# Patient Record
Sex: MALE | Race: WHITE
[De-identification: names, ages, dates, MRNs, and addresses within clinical notes are randomized per-mention and may not be internally consistent; named-entity substitution may affect disease eponyms.]

---

## 2018-05-23 ENCOUNTER — HOSPITAL ENCOUNTER (OUTPATIENT)
Age: 68
End: 2018-05-23
Payer: COMMERCIAL

## 2018-05-23 DIAGNOSIS — M51.26: Primary | ICD-10-CM

## 2018-05-23 LAB
ADD MANUAL DIFF / SLIDE REVIEW: NO
BUN SERPL-MCNC: 16 MG/DL (ref 9–20)
CALCIUM SERPL-MCNC: 9.2 MG/DL (ref 8.4–10.2)
CHLORIDE SERPL-SCNC: 102 MMOL/L (ref 98–107)
CO2 SERPL-SCNC: 28 MMOL/L (ref 22–32)
ESTIMATED GLOMERULAR FILT RATE: > 60 ML/MIN (ref 60–?)
GLUCOSE SERPL-MCNC: 79 MG/DL (ref 80–110)
HEMATOCRIT: 44 % (ref 41–53)
HEMOGLOBIN: 15.4 G/DL (ref 13.5–17.5)
HEMOLYSIS: < 15 (ref 0–50)
MCV RBC: 87.9 FL (ref 80–100)
MEAN CORPUSCULAR HEMOGLOBIN: 30.8 PG (ref 26–34)
MEAN CORPUSCULAR HGB CONC: 35 % (ref 30–36)
PLATELET COUNT: 212 X10^3/UL (ref 150–400)
POTASSIUM SERPL-SCNC: 4.7 MMOL/L (ref 3.4–5.1)
SODIUM SERPL-SCNC: 141 MMOL/L (ref 137–145)

## 2018-05-23 PROCEDURE — 80048 BASIC METABOLIC PNL TOTAL CA: CPT

## 2018-05-23 PROCEDURE — 36415 COLL VENOUS BLD VENIPUNCTURE: CPT

## 2018-05-23 PROCEDURE — 85025 COMPLETE CBC W/AUTO DIFF WBC: CPT

## 2018-05-23 PROCEDURE — 93005 ELECTROCARDIOGRAM TRACING: CPT

## 2018-06-05 ENCOUNTER — HOSPITAL ENCOUNTER (OUTPATIENT)
Age: 68
Discharge: HOME | End: 2018-06-05
Payer: COMMERCIAL

## 2018-06-05 VITALS — SYSTOLIC BLOOD PRESSURE: 100 MMHG | DIASTOLIC BLOOD PRESSURE: 61 MMHG | RESPIRATION RATE: 10 BRPM | HEART RATE: 86 BPM

## 2018-06-05 VITALS
RESPIRATION RATE: 10 BRPM | DIASTOLIC BLOOD PRESSURE: 58 MMHG | OXYGEN SATURATION: 98 % | SYSTOLIC BLOOD PRESSURE: 110 MMHG | HEART RATE: 86 BPM

## 2018-06-05 VITALS
SYSTOLIC BLOOD PRESSURE: 96 MMHG | OXYGEN SATURATION: 97 % | HEART RATE: 88 BPM | DIASTOLIC BLOOD PRESSURE: 57 MMHG | RESPIRATION RATE: 12 BRPM

## 2018-06-05 VITALS
DIASTOLIC BLOOD PRESSURE: 47 MMHG | SYSTOLIC BLOOD PRESSURE: 83 MMHG | HEART RATE: 84 BPM | TEMPERATURE: 97.16 F | RESPIRATION RATE: 12 BRPM | OXYGEN SATURATION: 96 %

## 2018-06-05 VITALS
OXYGEN SATURATION: 93 % | SYSTOLIC BLOOD PRESSURE: 104 MMHG | RESPIRATION RATE: 16 BRPM | DIASTOLIC BLOOD PRESSURE: 60 MMHG | HEART RATE: 84 BPM

## 2018-06-05 VITALS
SYSTOLIC BLOOD PRESSURE: 101 MMHG | RESPIRATION RATE: 14 BRPM | DIASTOLIC BLOOD PRESSURE: 61 MMHG | TEMPERATURE: 96.98 F | HEART RATE: 84 BPM | OXYGEN SATURATION: 93 %

## 2018-06-05 VITALS
SYSTOLIC BLOOD PRESSURE: 99 MMHG | HEART RATE: 88 BPM | OXYGEN SATURATION: 94 % | RESPIRATION RATE: 12 BRPM | DIASTOLIC BLOOD PRESSURE: 55 MMHG

## 2018-06-05 VITALS — BODY MASS INDEX: 27.2 KG/M2

## 2018-06-05 VITALS
OXYGEN SATURATION: 92 % | HEART RATE: 84 BPM | RESPIRATION RATE: 16 BRPM | SYSTOLIC BLOOD PRESSURE: 107 MMHG | TEMPERATURE: 98 F | DIASTOLIC BLOOD PRESSURE: 61 MMHG

## 2018-06-05 VITALS
DIASTOLIC BLOOD PRESSURE: 89 MMHG | RESPIRATION RATE: 18 BRPM | SYSTOLIC BLOOD PRESSURE: 173 MMHG | OXYGEN SATURATION: 96 % | TEMPERATURE: 97.88 F | HEART RATE: 90 BPM

## 2018-06-05 VITALS
HEART RATE: 84 BPM | OXYGEN SATURATION: 92 % | DIASTOLIC BLOOD PRESSURE: 58 MMHG | RESPIRATION RATE: 12 BRPM | SYSTOLIC BLOOD PRESSURE: 102 MMHG

## 2018-06-05 DIAGNOSIS — Z87.891: ICD-10-CM

## 2018-06-05 DIAGNOSIS — I48.91: ICD-10-CM

## 2018-06-05 DIAGNOSIS — S39.012A: Primary | ICD-10-CM

## 2018-06-05 PROCEDURE — 63030 LAMOT DCMPRN NRV RT 1 LMBR: CPT

## 2018-06-05 PROCEDURE — 72100 X-RAY EXAM L-S SPINE 2/3 VWS: CPT

## 2018-06-05 PROCEDURE — 76000 FLUOROSCOPY <1 HR PHYS/QHP: CPT

## 2018-06-05 NOTE — P.OP_ITS
Operative Date/Time/Diagnoses    
-    
Date of procedure: 06/05/18    
Time of procedure: 09:23    
Pre-op diagnosis: Lumbar disc herniation at L1-2 with radiculopathy     
    
Post-op diagnosis: same    
    
Procedure & Clinicians    
Procedure: Right L1-2 diskectomy    
Use of microscope    
Placement of epidural catheter    
Same procedure as scheduled: Yes    
Indications: 67-year-old male with intractable radiculopathy from a disc   
herniation.  He failed conservative management and requested operative   
intervention.  Risks and benefits of surgery were discussed and appropriate   
consent obtained.    
Surgeon: Constantin Ritter    
Assistant: Felicia Menchaca    
Anesthesia Type: General    
Operative Notes    
Findings: None    
Closure Type: primary    
Specimen(s): none sent    
Estimated Blood Loss (mL): 10    
Blood products transfused: none    
Procedure in detail: Patient was brought to the operating room and intubated on   
the table. They were rolled over on the well-padded prone position on the   
Nicko table. A time-out was performed. Preoperative antibiotics were given.   
The back was prepped and draped in standard sterile fashion.    
Using fluoroscopy for localization, a 3 cm incision was made in the midline. We   
used Bovie to dissect through the lumbodorsal fascia and then subperiosteally   
dissect the paraspinal muscles off the well-marked right side. A marker was   
placed and x-ray was taken to confirm positioning.    
We then brought in the microscope. A right-sided laminotomy was performed at L1-  
2.  We had to go out laterally but care was taken to preserve the pars as well   
as the facet and not destabilize them. The dura was carefully retracted   
medially and the disc was exposed. There was minimal bulging from the level of   
the disc itself but there was a large extruded herniation running below this   
corresponding with his MRI. We then performed an annulotomy with a scalpel and   
then a diskectomy with pituitary.  We removed several large fragments The ball   
probe was run into the disc space to make sure there were no further loose   
fragments.  The ball probe was run below the dura to make sure there was no   
further pressure on the nerves.  Everything was decompressed.    
The wound was irrigated. An epidural catheter was prepared with 8 mL of 0.25%   
Marcaine and 100 mcg of fentanyl.  The dura was carefully depressed and the   
catheter was advanced 6 cm cephalad underneath remaining lamina without   
resistance. The fascia was then closed in layers.  The epidural catheter was   
injected without complications. Vancomycin powder was placed in the wound.  The   
superficial and the skin were closed.  Sterile dressing was placed. Patient was   
rolled over extubated brought to recovery room with no complications.    
Complications: none    
Condition: stable    
Disposition: PACU    
Plan for aftercare: Outpatient.  Limited bend, lift, twist for 6 weeks.

## 2018-06-05 NOTE — PM.OP.1
Operative Date/Time/Diagnoses
-
Date of procedure: 06/05/18
Time of procedure: 09:23
Pre-op diagnosis: Lumbar disc herniation at L1-2 with radiculopathy 

Post-op diagnosis: same

Procedure & Clinicians
Procedure: Right L1-2 diskectomy
Use of microscope
Placement of epidural catheter
Same procedure as scheduled: Yes
Indications: 67-year-old male with intractable radiculopathy from a disc herniation.  He failed conservative management and requested operative intervention.  Risks and benefits of surgery were discussed and appropriate consent obtained.
Surgeon: Constantin Ritter
Assistant: Felicia Menchaca
Anesthesia Type: General
Operative Notes
Findings: None
Closure Type: primary
Specimen(s): none sent
Estimated Blood Loss (mL): 10
Blood products transfused: none
Procedure in detail: Patient was brought to the operating room and intubated on the table. They were rolled over on the well-padded prone position on the Nicko table. A time-out was performed. Preoperative antibiotics were given. The back was 
prepped and draped in standard sterile fashion.
Using fluoroscopy for localization, a 3 cm incision was made in the midline. We used Bovie to dissect through the lumbodorsal fascia and then subperiosteally dissect the paraspinal muscles off the well-marked right side. A marker was placed and 
x-ray was taken to confirm positioning.
We then brought in the microscope. A right-sided laminotomy was performed at L1-2.  We had to go out laterally but care was taken to preserve the pars as well as the facet and not destabilize them. The dura was carefully retracted medially and the 
disc was exposed. There was minimal bulging from the level of the disc itself but there was a large extruded herniation running below this corresponding with his MRI. We then performed an annulotomy with a scalpel and then a diskectomy with 
pituitary.  We removed several large fragments The ball probe was run into the disc space to make sure there were no further loose fragments.  The ball probe was run below the dura to make sure there was no further pressure on the nerves.  
Everything was decompressed.
The wound was irrigated. An epidural catheter was prepared with 8 mL of 0.25% Marcaine and 100 mcg of fentanyl.  The dura was carefully depressed and the catheter was advanced 6 cm cephalad underneath remaining lamina without resistance. The fascia 
was then closed in layers.  The epidural catheter was injected without complications. Vancomycin powder was placed in the wound.  The superficial and the skin were closed.  Sterile dressing was placed. Patient was rolled over extubated brought to 
recovery room with no complications.
Complications: none
Condition: stable
Disposition: PACU
Plan for aftercare: Outpatient.  Limited bend, lift, twist for 6 weeks.

## 2018-06-05 NOTE — SUR.PHASEI
A bit slow to awaken. Initially restless. Some upper airway congestion clearing with cough. On arrival hypotensive with IVF ongoing and head flat to 10 degrees. No shivering.

## 2018-06-05 NOTE — DI.RAD.S_ITS
PROCEDURE:  XR LUMBAR SPINE 2-3V  
   
INDICATIONS:  L1-2 TLIF  
   
TECHNIQUE: 2 views of the lumbar spine were acquired.    
   
COMPARISON:  [facility] [place], CR, XR LUMBAR SPINE WITH OBLIQUES,   
4/30/2018, 9:20.  
   
FINDINGS:    
   
Bones: 5 non-rib-bearing vertebrae are present.  There is normal bony alignment.  No   
vertebral body compression fractures.  No suspicious bony lesions.  A metallic probe   
overlies the L1-L2 posterior elements, intended access level from the clinical history   
provided.   
   
Soft tissues:  Overlying bowel gas pattern is normal.  No suspicious soft tissue   
calcifications.    
   
IMPRESSION: Successful L1-L2 posterior element localization.  
   
   
   
Dictated by:  Dickson Sheppard M.D. on 6/05/2018 at 9:18       
Approved by:  Dickson Sheppard M.D. on 6/05/2018 at 9:20

## 2018-06-05 NOTE — SUR.OPER
Prone on spine table, head in foam head support, padded chest and pelvic supports, gel pad at knees, lower legs supported by pillows; nipples, genitalia and toes free of pressure, arms secured on foam padded arm boards at <90 degrees abduction.  
Tape over blanket at thigh secured to table.

## 2018-06-05 NOTE — P.OP.PRE_ITS
Pre-operative Note    
Interval Note    
Pre-op Check: History & Physical Reviewed by Physician and Exam Performed

## 2018-06-05 NOTE — SUR.PHASEII
On discharge along with intermittent cough, also with some itching. He does note this was present preop.

## 2019-01-28 ENCOUNTER — HOSPITAL ENCOUNTER (OUTPATIENT)
Age: 69
End: 2019-01-28
Payer: MEDICARE

## 2019-01-28 DIAGNOSIS — M19.042: Primary | ICD-10-CM

## 2019-01-28 DIAGNOSIS — M19.041: ICD-10-CM

## 2019-01-28 PROCEDURE — 73130 X-RAY EXAM OF HAND: CPT

## 2019-01-28 NOTE — DI.RAD.S_ITS
PROCEDURE:  XR HAND RT MIN 3V  
   
INDICATIONS:  Osteoartritis  
   
TECHNIQUE: 3 views of the hand(s) acquired.    
   
COMPARISON:  [facility], CR, XR HAND LT MIN 3V, 1/28/2019, 11:12.  
   
FINDINGS:    
   
Bones:  No fractures or dislocations.  Carpal bones are normally aligned.  No suspicious   
bony lesions.  The degenerative osteoarthritic change at the left hand and the right hand   
are virtually equivalent.  Moderately severe degeneration is present without erosive   
arthritis.  
   
Soft tissues:  No suspicious soft tissue calcifications.    
   
IMPRESSION: Moderately severe right hand degenerative osteoarthritis, virtually   
equivalent to that seen on the left same day.  
   
   
   
Dictated by: Dickson Sheppard M.D. on 1/28/2019 at 12:10       
Approved by: Dickson Sheppard M.D. on 1/28/2019 at 12:11

## 2019-01-28 NOTE — DI.RAD.S_ITS
PROCEDURE:  XR HAND LT MIN 3V  
   
INDICATIONS:  Osteoartritis  
   
TECHNIQUE: 3 views of the hand(s) acquired.    
   
COMPARISON:  None.  
   
FINDINGS:    
   
Bones:  No fractures or dislocations.  Carpal bones are normally aligned.  No suspicious   
bony lesions.  Moderately severe interphalan geal osteoarthritis present without erosive   
changes.  There is also significant degeneration at the second and third   
metacarpal-phalangeal and base of the first metacarpal joint osteoarthritis.  
   
Soft tissues:  No suspicious soft tissue calcifications.    
   
IMPRESSION: Moderately severe osteoarthritis, though erosive arthritis found.    
   
   
   
Dictated by: Dickson Sheppard M.D. on 1/28/2019 at 12:09       
Approved by: Dickson Sheppard M.D. on 1/28/2019 at 12:10

## 2019-02-25 ENCOUNTER — HOSPITAL ENCOUNTER (OUTPATIENT)
Age: 69
Discharge: HOME | End: 2019-02-25
Payer: MEDICARE

## 2019-02-25 VITALS
SYSTOLIC BLOOD PRESSURE: 127 MMHG | DIASTOLIC BLOOD PRESSURE: 74 MMHG | HEART RATE: 97 BPM | RESPIRATION RATE: 15 BRPM | TEMPERATURE: 96.9 F | OXYGEN SATURATION: 96 %

## 2019-02-25 VITALS — BODY MASS INDEX: 26.6 KG/M2

## 2019-02-25 VITALS
TEMPERATURE: 98.8 F | DIASTOLIC BLOOD PRESSURE: 62 MMHG | RESPIRATION RATE: 15 BRPM | SYSTOLIC BLOOD PRESSURE: 97 MMHG | HEART RATE: 71 BPM | OXYGEN SATURATION: 97 %

## 2019-02-25 DIAGNOSIS — I48.91: ICD-10-CM

## 2019-02-25 DIAGNOSIS — Z12.11: Primary | ICD-10-CM

## 2019-02-25 DIAGNOSIS — K57.30: ICD-10-CM

## 2019-02-25 DIAGNOSIS — Z87.891: ICD-10-CM

## 2019-02-25 DIAGNOSIS — I10: ICD-10-CM

## 2019-02-25 PROCEDURE — 0DJD8ZZ INSPECTION OF LOWER INTESTINAL TRACT, VIA NATURAL OR ARTIFICIAL OPENING ENDOSCOPIC: ICD-10-PCS | Performed by: SURGERY

## 2019-02-25 PROCEDURE — G0121 COLON CA SCRN NOT HI RSK IND: HCPCS

## 2019-02-25 PROCEDURE — 99152 MOD SED SAME PHYS/QHP 5/>YRS: CPT

## 2019-02-25 NOTE — P.HP_ITS
"History of Present Illness    
Date Patient Seen: 19    
Time Patient Seen: 08:20    
Chief complaint: 73845 Colonoscopy    
Narrative: 68-year-old male who presents for colorectal screening.  His last   
examination was well over 10 years ago.  He denies any recent change in bowel   
habits.  He does have some chronic loose stools however.  Denies any other   
gastrointestinal symptoms such as nausea, vomiting, abdominal pain, loss of   
appetite, unexplained weight loss, constipation, melena, hematochezia, or bright  
red blood per rectum.    
    
Patient History    
Medical History (Reviewed 19 @ 08:20 by Gomez Becker MD)    
    
Atrial fibrillation (Chronic)    
Terry's syndrome (Chronic )    
Chronic hepatitis C (Chronic)    
Erectile dysfunction (Chronic)    
Hearing deficit (Chronic)    
Hypertension (Chronic)    
Lumbar disc herniation with radiculopathy (Chronic ~2018)    
Psoriasis (Chronic)    
Strain of lumbar region (Chronic ~2018)    
    
    
Surgical History (Reviewed 19 @ 08:20 by Gomez Becker MD)    
    
H/O colonoscopy (Resolved)    
H/O hernia repair (Resolved)    
History of arthroscopic knee surgery (Resolved)    
History of cardiac radiofrequency ablation (Resolved )    
S/P right knee arthroscopy (Resolved)    
Status post arthroscopy (Resolved)    
Status post cholecystectomy (Resolved)    
    
Family History    
Grandmother      Stroke    
    
Social History    
household members:  significant other     
Smoking Status:  Former smoker     
alcohol intake:  never     
    
    
Family & Social History    
Family History (Reviewed 19 @ 08:20 by Gomez Becker MD)    
Grandmother      Stroke    
    
    
Social History:                             
    
household members                significant other    
    
    
Tobacco & Substance use:                    
    
Smoking Status                   Former smoker    
alcohol intake                   never    
Substance Use Type               does not use    
    
    
    
Meds    
                                Home Medications    
    
    
    
 Medication  Instructions  Recorded  Confirmed  Type    
     
losartan 50 mg PO DAILY #0 17 History    
     
meloxicam [Mobic] 15 mg PO SEEINSTR 18 History    
     
flecainide 75 mg PO BID 18 History    
     
sildenafil (antihypertensive) 20 mg PO SEE INSTRUCTIONS 18   
History    
    
[Revatio]        
     
tamsulosin 0.4 mg capsule 0.4 mg PO DAILY #15 tab 18  Rx    
    
    
    
                                    Allergies    
    
    
    
Allergy/AdvReac Type Severity Reaction Status Date / Time    
     
doxycycline Allergy Unknown RASH Verified 18 09:52    
     
fenoprofen AdvReac Unknown MOUTH AND Verified 18 09:52    
    
   NOSE      
    
   SWELLING      
    
   WITH      
    
   FENOPROFEN      
    
   (NALFON)      
    
    
    
    
Review of Systems    
Review of Systems    
All systems reviewed & are unremarkable except as noted in HPI and below    
    
Exam    
Vital Signs    
(past 8 hours):                         -    
    
    
    
 19    
07:57    
     
Temperature 96.9 F L    
     
Pulse Rate 97 H    
     
Respiratory Rate 15    
     
Blood Pressure 127/74    
     
Pulse Oximetry 96    
    
    
                                            
    
    
    
Oxygen Delivery Method         Room Air    
    
    
    
    
Narrative    
Exam Narrative: Well-nourished well-developed male in no acute distress.  Alert   
oriented x3.  Wife is at the bedside for my entire visit.    
Sclera nonicteric    
Irregular rhythm but rate controlled    
No wheezes    
Abdomen soft, nondistended, nontender, no masses    
Extremities show no clubbing or cyanosis    
    
Objective    
Labs  
[id number]|2019 08:51:00|2019 08:49:00|P.OP.ENDO_ITS|[facility]|Health Information Management|[id number]|"Operative Date/Time/Diagnoses

## 2019-02-25 NOTE — PM.PREOP
Pre-operative Note
Interval Note
History & Physical reviewed/Exam performed by Physician: Yes
Changes to H&P: No
H&P completed within 30 days and has changed as indicated here:: Patient seen and examined today.  History and physical examination documented and placed on the chart.  Obviously, there have been no changes in the last 15 min.  Proceed with 
colonoscopy today as planned. 

ASA Class (for procedural sedation): II

## 2019-02-25 NOTE — PM.HP.1
"History of Present Illness
Date Patient Seen: 19
Time Patient Seen: 08:20
Chief complaint: [id number] Colonoscopy
Narrative: 68-year-old male who presents for colorectal screening.  His last examination was well over 10 years ago.  He denies any recent change in bowel habits.  He does have some chronic loose stools however.  Denies any other gastrointestinal 
symptoms such as nausea, vomiting, abdominal pain, loss of appetite, unexplained weight loss, constipation, melena, hematochezia, or bright red blood per rectum.

Patient History
Medical History (Reviewed 19 @ 08:20 by Gomez Becker MD)

Atrial fibrillation (Chronic)
Terry's syndrome (Chronic )
Chronic hepatitis C (Chronic)
Erectile dysfunction (Chronic)
Hearing deficit (Chronic)
Hypertension (Chronic)
Lumbar disc herniation with radiculopathy (Chronic ~2018)
Psoriasis (Chronic)
Strain of lumbar region (Chronic ~2018)


Surgical History (Reviewed 19 @ 08:20 by Gomez Becker MD)

H/O colonoscopy (Resolved)
H/O hernia repair (Resolved)
History of arthroscopic knee surgery (Resolved)
History of cardiac radiofrequency ablation (Resolved )
S/P right knee arthroscopy (Resolved)
Status post arthroscopy (Resolved)
Status post cholecystectomy (Resolved)

Family History
Grandmother   
 Stroke

Social History
household members:  significant other 
Smoking Status:  Former smoker 
alcohol intake:  never 


Family & Social History
Family History (Reviewed 19 @ 08:20 by Gomez Becker MD)
Grandmother   
 Stroke


Social History:  

household members              significant other


Tobacco & Substance use:  

Smoking Status                 Former smoker
alcohol intake                 never
Substance Use Type             does not use



Meds
Home Medications

 Medication  Instructions  Recorded  Confirmed  Type
losartan 50 mg PO DAILY #0 17 History
meloxicam [Mobic] 15 mg PO SEEINSTR 18 History
flecainide 75 mg PO BID 18 History
sildenafil (antihypertensive) 20 mg PO SEE INSTRUCTIONS 18 History
[Revatio]    
tamsulosin 0.4 mg capsule 0.4 mg PO DAILY #15 tab 18  Rx


Allergies

Allergy/AdvReac Type Severity Reaction Status Date / Time
doxycycline Allergy Unknown RASH Verified 18 09:52
fenoprofen AdvReac Unknown MOUTH AND Verified 18 09:52
   NOSE  
   SWELLING  
   WITH  
   FENOPROFEN  
   (NALFON)  



Review of Systems
Review of Systems
All systems reviewed & are unremarkable except as noted in HPI and below

Exam
Vital Signs
(past 8 hours):  -

 19
07:57
Temperature 96.9 F L
Pulse Rate 97 H
Respiratory Rate 15
Blood Pressure 127/74
Pulse Oximetry 96



Oxygen Delivery Method         Room Air



Narrative
Exam Narrative: Well-nourished well-developed male in no acute distress.  Alert oriented x3.  Wife is at the bedside for my entire visit.
Sclera nonicteric
Irregular rhythm but rate controlled
No wheezes
Abdomen soft, nondistended, nontender, no masses
Extremities show no clubbing or cyanosis

Objective
Labs
Labs: No recent laboratory or radiographic studies for review

Assessment & Plan
Assessment & Plan narrative: 68-year-old male requiring colorectal screening by age criteria.  In addition, his last examination was well over 10 years ago.  Colonoscopy is currently recommended.  Technical details of the procedure were discussed.  
Risks, benefits, alternatives were explained.  Risks including but not limited to sedation, aspiration, bleeding, incomplete examination, missed lesion, need for further radiographic studies, colonic perforation, need for major abdominal surgery, 
and all attendant risks of major surgery were discussed at length. We also discussed the remote possibility of cardiac event such as rapid ventricular rate.  All questions were answered to his satisfaction, and he voiced understanding.  Consent was 
placed on 
[id number]|2019 08:49:17|2019 08:49:17|PM.OP.ENDO||||"Operative Date/Time/Diagnoses

## 2020-05-24 ENCOUNTER — HOSPITAL ENCOUNTER (EMERGENCY)
Age: 70
Discharge: HOME | End: 2020-05-24
Payer: MEDICARE

## 2020-05-24 VITALS
RESPIRATION RATE: 18 BRPM | SYSTOLIC BLOOD PRESSURE: 142 MMHG | DIASTOLIC BLOOD PRESSURE: 67 MMHG | TEMPERATURE: 96.08 F | HEART RATE: 78 BPM | OXYGEN SATURATION: 98 %

## 2020-05-24 VITALS — BODY MASS INDEX: 26.6 KG/M2

## 2020-05-24 DIAGNOSIS — Z23: ICD-10-CM

## 2020-05-24 DIAGNOSIS — S61.211A: Primary | ICD-10-CM
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
## 2020-05-24 PROCEDURE — 90471 IMMUNIZATION ADMIN: CPT

## 2020-05-24 PROCEDURE — 12001 RPR S/N/AX/GEN/TRNK 2.5CM/<: CPT

## 2020-05-24 PROCEDURE — 99283 EMERGENCY DEPT VISIT LOW MDM: CPT

## 2020-05-24 PROCEDURE — 99284 EMERGENCY DEPT VISIT MOD MDM: CPT

## 2020-05-24 NOTE — ED.WOUNDLAC
"HPI - Wound/Laceration
<HAYDE Wang - Last Filed: 20 13:30>
General
Chief Complaint: Wound/Laceration
Stated Complaint: left hand 2nd digit cut today
Time Seen by Provider: 20 11:55
Source: patient
Mode of arrival: Family Vehicle
Limitations: no limitations
History of Present Illness
HPI narrative: 69-year-old male with history of hypertension and AFib, present to the emergency department for a laceration of his left 2nd finger today.  He states he was working on some shingles and cut his finger on tin.  He is unsure when last 
Tdap was.  Patient denies any numbness or tingling or difficulty moving his finger.  He denies any signs of infection such as fever, purulent drainage, redness, or any other concerns.  Patient denies any other injuries or falls.
Related Data
Home Medications

 Medication  Instructions  Recorded  Confirmed
losartan 50 mg PO DAILY #0 17
meloxicam [Mobic] 15 mg PO SEEINSTR 18
flecainide 75 mg PO BID 18
sildenafil (pulm.hypertension) 20 mg PO SEE INSTRUCTIONS 18
[Revatio]   

Previous Rx's

 Medication  Instructions  Recorded
tamsulosin 0.4 mg capsule 0.4 mg PO DAILY #15 tab 18


Allergies

Allergy/AdvReac Type Severity Reaction Status Date / Time
doxycycline Allergy Unknown RASH Verified 20 11:48
fenoprofen AdvReac Unknown MOUTH AND Verified 20 11:48
   NOSE  
   SWELLING  
   WITH  
   FENOPROFEN  
   (NALFON)  



Review of Systems
<HAYDE Wang - Last Filed: 20 13:30>
Review of Systems
Narrative: REVIEW OF SYSTEMS:

GENERAL: Denies fever or chills. 

HENT: Denies head trauma.

MUSCULOSKELETAL: Denies weakness, or deformities. 

INTEGUMENTARY: Complains of laceration to left 2nd finger, see HPI.  

NEURO: Denies numbness or tingling. 




Patient History
<HAYDE Wang - Last Filed: 20 13:30>
Medical History (Reviewed 20 @ 12:50 by HAYDE Wang)

Atrial fibrillation (Chronic)
Terry's syndrome (Chronic 2009)
Chronic hepatitis C (Chronic)
Erectile dysfunction (Chronic)
Hearing deficit (Chronic)
Hypertension (Chronic)
Lumbar disc herniation with radiculopathy (Chronic ~2018)
Psoriasis (Chronic)
Strain of lumbar region (Chronic ~2018)


Surgical History (Reviewed 20 @ 12:50 by HAYDE Wang)

H/O colonoscopy (Resolved)
H/O hernia repair (Resolved)
History of arthroscopic knee surgery (Resolved)
History of cardiac radiofrequency ablation (Resolved )
S/P right knee arthroscopy (Resolved)
Status post arthroscopy (Resolved)
Status post cholecystectomy (Resolved)


Family History (Reviewed 20 @ 12:50 by HAYDE Wang)
Grandmother   
 Stroke


Social History (Reviewed 20 @ 12:50 by HAYDE Wang)
household members:  significant other 
Smoking Status:  Former smoker 
alcohol intake:  never 


Smoking Status: Former smoker
alcohol intake frequency: 0-2 drinks per day
Substance Use Type: does not use

Exam
<HAYDE Wang - Last Filed: 20 13:30>
Initial Vital Signs
Initial Vital Signs: Vital Signs

Temperature  96.0 F L  20 11:48
Pulse Rate  78   20 11:48
Respiratory Rate  18   20 11:48
Blood Pressure  142/67 H  20 11:48
Pulse Oximetry  98   20 11:48

PHYSICAL EXAMINATION: 

GENERAL: Well groomed, alert, and cooperative. Answers questions promptly and appropriately. Vital signs noted. 

HENT: Normocephalic, atraumatic.

RESPIRATORY: Normal respiratory rate, trachea midline, airway patent. No stridor, nasal flaring or accessory muscle use.

MUSCULOSKELETAL: Normal gait and coordination. Equal tone and mass bilaterally. 

EXTREMITIES: CMS intact. Moves all extremities. 

SKIN: Warm, dry, soft, appropriate color for ethnicity.  A 3 cm fairly superficial laceration along palmar joint line of dip joint of left 2nd finger.  Minor amount of subcu tissue visualized, 
[id number]|2020 12:37:38|2020 12:37:38|ED.LOWEXIN||||"HPI - Extremity Injury (Lower)

## 2020-08-25 ENCOUNTER — HOSPITAL ENCOUNTER (OUTPATIENT)
Age: 70
End: 2020-08-25
Payer: MEDICARE

## 2020-08-25 DIAGNOSIS — I10: Primary | ICD-10-CM

## 2020-08-25 LAB
ALBUMIN SERPL-MCNC: 4.2 G/DL (ref 3.5–5)
ALBUMIN/GLOB SERPL: 1.4 {RATIO} (ref 1–2.8)
ALP SERPL-CCNC: 117 U/L (ref 38–126)
ALT SERPL-CCNC: 21 IU/L (ref ?–50)
BUN SERPL-MCNC: 15 MG/DL (ref 9–20)
CALCIUM SERPL-MCNC: 9.1 MG/DL (ref 8.4–10.2)
CHLORIDE SERPL-SCNC: 102 MMOL/L (ref 98–107)
CHOLEST SERPL-MCNC: 229 MG/DL (ref 140–199)
CO2 SERPL-SCNC: 30 MMOL/L (ref 22–32)
ESTIMATED GLOMERULAR FILT RATE: > 60 ML/MIN (ref 60–?)
GLOBULIN SER CALC-MCNC: 3.1 G/DL (ref 1.7–4.1)
GLUCOSE SERPL-MCNC: 121 MG/DL (ref 80–110)
HDLC SERPL-MCNC: 41 MG/DL (ref 40–60)
HEMOLYSIS: < 15 (ref 0–50)
POTASSIUM SERPL-SCNC: 4.9 MMOL/L (ref 3.4–5.1)
PROT SERPL-MCNC: 7.3 G/DL (ref 6.3–8.2)
SODIUM SERPL-SCNC: 139 MMOL/L (ref 137–145)
TRIGL SERPL-MCNC: 191 MG/DL (ref 35–150)

## 2020-08-25 PROCEDURE — 80061 LIPID PANEL: CPT

## 2020-08-25 PROCEDURE — 84154 ASSAY OF PSA FREE: CPT

## 2020-08-25 PROCEDURE — 36415 COLL VENOUS BLD VENIPUNCTURE: CPT

## 2020-08-25 PROCEDURE — 80053 COMPREHEN METABOLIC PANEL: CPT

## 2020-08-25 PROCEDURE — 84153 ASSAY OF PSA TOTAL: CPT

## 2020-08-26 LAB — PSA SERPL-MCNC: 0.6 NG/ML (ref 0–4)

## 2020-12-02 ENCOUNTER — HOSPITAL ENCOUNTER (EMERGENCY)
Age: 70
Discharge: HOME | End: 2020-12-02
Payer: MEDICARE

## 2020-12-02 VITALS
SYSTOLIC BLOOD PRESSURE: 179 MMHG | RESPIRATION RATE: 13 BRPM | OXYGEN SATURATION: 95 % | DIASTOLIC BLOOD PRESSURE: 86 MMHG | HEART RATE: 73 BPM

## 2020-12-02 VITALS
RESPIRATION RATE: 15 BRPM | DIASTOLIC BLOOD PRESSURE: 73 MMHG | OXYGEN SATURATION: 96 % | HEART RATE: 72 BPM | SYSTOLIC BLOOD PRESSURE: 154 MMHG

## 2020-12-02 VITALS — HEART RATE: 77 BPM | OXYGEN SATURATION: 98 %

## 2020-12-02 VITALS
SYSTOLIC BLOOD PRESSURE: 183 MMHG | OXYGEN SATURATION: 98 % | RESPIRATION RATE: 22 BRPM | DIASTOLIC BLOOD PRESSURE: 100 MMHG | HEART RATE: 78 BPM

## 2020-12-02 VITALS — SYSTOLIC BLOOD PRESSURE: 192 MMHG | DIASTOLIC BLOOD PRESSURE: 91 MMHG | HEART RATE: 78 BPM | OXYGEN SATURATION: 96 %

## 2020-12-02 VITALS
DIASTOLIC BLOOD PRESSURE: 85 MMHG | OXYGEN SATURATION: 97 % | SYSTOLIC BLOOD PRESSURE: 176 MMHG | RESPIRATION RATE: 18 BRPM | HEART RATE: 75 BPM

## 2020-12-02 VITALS — SYSTOLIC BLOOD PRESSURE: 164 MMHG | DIASTOLIC BLOOD PRESSURE: 85 MMHG | HEART RATE: 79 BPM

## 2020-12-02 VITALS
DIASTOLIC BLOOD PRESSURE: 91 MMHG | RESPIRATION RATE: 16 BRPM | TEMPERATURE: 97.7 F | SYSTOLIC BLOOD PRESSURE: 192 MMHG | HEART RATE: 77 BPM | OXYGEN SATURATION: 99 %

## 2020-12-02 VITALS — BODY MASS INDEX: 26.9 KG/M2

## 2020-12-02 VITALS
SYSTOLIC BLOOD PRESSURE: 173 MMHG | HEART RATE: 72 BPM | OXYGEN SATURATION: 97 % | RESPIRATION RATE: 16 BRPM | DIASTOLIC BLOOD PRESSURE: 92 MMHG

## 2020-12-02 VITALS — HEART RATE: 79 BPM | SYSTOLIC BLOOD PRESSURE: 176 MMHG | DIASTOLIC BLOOD PRESSURE: 84 MMHG | OXYGEN SATURATION: 99 %

## 2020-12-02 VITALS
DIASTOLIC BLOOD PRESSURE: 81 MMHG | RESPIRATION RATE: 13 BRPM | OXYGEN SATURATION: 95 % | HEART RATE: 71 BPM | SYSTOLIC BLOOD PRESSURE: 166 MMHG

## 2020-12-02 VITALS — DIASTOLIC BLOOD PRESSURE: 77 MMHG | OXYGEN SATURATION: 98 % | HEART RATE: 73 BPM | SYSTOLIC BLOOD PRESSURE: 154 MMHG

## 2020-12-02 VITALS
OXYGEN SATURATION: 98 % | SYSTOLIC BLOOD PRESSURE: 164 MMHG | HEART RATE: 76 BPM | RESPIRATION RATE: 17 BRPM | DIASTOLIC BLOOD PRESSURE: 85 MMHG

## 2020-12-02 DIAGNOSIS — I10: ICD-10-CM

## 2020-12-02 DIAGNOSIS — S39.012A: ICD-10-CM

## 2020-12-02 DIAGNOSIS — I48.0: Primary | ICD-10-CM

## 2020-12-02 LAB
ADD MANUAL DIFF / SLIDE REVIEW: NO
ALBUMIN SERPL-MCNC: 4.3 G/DL (ref 3.5–5)
ALBUMIN/GLOB SERPL: 1.4 {RATIO} (ref 1–2.8)
ALP SERPL-CCNC: 86 U/L (ref 38–126)
ALT SERPL-CCNC: 26 IU/L (ref ?–50)
BUN SERPL-MCNC: 13 MG/DL (ref 9–20)
CALCIUM SERPL-MCNC: 8.8 MG/DL (ref 8.4–10.2)
CHLORIDE SERPL-SCNC: 104 MMOL/L (ref 98–107)
CK SERPL-CCNC: 154 U/L (ref 55–170)
CKMB % RELATIVE INDEX: 0.7 % (ref 1.5–5)
CO2 SERPL-SCNC: 31 MMOL/L (ref 22–32)
ESTIMATED GLOMERULAR FILT RATE: > 60 ML/MIN (ref 60–?)
GLOBULIN SER CALC-MCNC: 3.1 G/DL (ref 1.7–4.1)
GLUCOSE SERPL-MCNC: 192 MG/DL (ref 80–110)
HEMATOCRIT: 43.5 % (ref 41–53)
HEMOGLOBIN: 14.8 G/DL (ref 13.5–17.5)
HEMOLYSIS: < 15 (ref 0–50)
LIPASE SERPL-CCNC: 74 U/L (ref 23–300)
LYMPHOCYTES # SPEC AUTO: 1400 /UL (ref 1100–4500)
MCV RBC: 87.9 FL (ref 80–100)
MEAN CORPUSCULAR HEMOGLOBIN: 30 PG (ref 26–34)
MEAN CORPUSCULAR HGB CONC: 34.1 % (ref 30–36)
PLATELET COUNT: 199 X10^3/UL (ref 150–400)
POTASSIUM SERPL-SCNC: 4 MMOL/L (ref 3.4–5.1)
PROT SERPL-MCNC: 7.4 G/DL (ref 6.3–8.2)
SODIUM SERPL-SCNC: 139 MMOL/L (ref 137–145)
TROPONIN I SERPL-MCNC: < 0.012 NG/ML (ref 0.01–0.03)

## 2020-12-02 PROCEDURE — 81003 URINALYSIS AUTO W/O SCOPE: CPT

## 2020-12-02 PROCEDURE — 96374 THER/PROPH/DIAG INJ IV PUSH: CPT

## 2020-12-02 PROCEDURE — 82553 CREATINE MB FRACTION: CPT

## 2020-12-02 PROCEDURE — 99283 EMERGENCY DEPT VISIT LOW MDM: CPT

## 2020-12-02 PROCEDURE — 96361 HYDRATE IV INFUSION ADD-ON: CPT

## 2020-12-02 PROCEDURE — 93005 ELECTROCARDIOGRAM TRACING: CPT

## 2020-12-02 PROCEDURE — 85025 COMPLETE CBC W/AUTO DIFF WBC: CPT

## 2020-12-02 PROCEDURE — 71045 X-RAY EXAM CHEST 1 VIEW: CPT

## 2020-12-02 PROCEDURE — 99284 EMERGENCY DEPT VISIT MOD MDM: CPT

## 2020-12-02 PROCEDURE — 84484 ASSAY OF TROPONIN QUANT: CPT

## 2020-12-02 PROCEDURE — 80053 COMPREHEN METABOLIC PANEL: CPT

## 2020-12-02 PROCEDURE — 83690 ASSAY OF LIPASE: CPT

## 2020-12-02 PROCEDURE — 82550 ASSAY OF CK (CPK): CPT

## 2020-12-02 PROCEDURE — 36415 COLL VENOUS BLD VENIPUNCTURE: CPT

## 2020-12-02 NOTE — ED_ITS
"HPI - General Adult    
General    
Chief complaint: Hypertension    
Stated complaint: High Blood Pressure    
Time Seen by Provider: 20 10:20    
Source: patient    
Mode of arrival: Ambulatory    
History of Present Illness    
HPI narrative: The patient presents with a concern with his blood pressure.  He   
takes losartan daily.  He has had his morning medications.  His blood pressure   
is 190 systolic at home.  He describes his blood pressure normally as 120-130   
systolic.  Of note he is on flecainide for AFib.  He has no chest pain or   
palpitations.  He denies dyspnea.  He has no headache, no visual changes or   
confusion.  He denies recent illness.  He is complaining of right mid back pain.  
 Pain was onset this morning.  He has no associated abdominal pain.  He has no   
dysuria or hematuria.  He has no genital pain.  He has pain with motion, he can   
improve the pain with positioning how he rests.    
Related Data    
                                Home Medications    
    
    
    
 Medication  Instructions  Recorded  Confirmed    
     
losartan 50 mg PO DAILY #0 08/29/17 10/28/20    
     
meloxicam [Mobic] 15 mg PO SEEINSTR 05/30/18 10/28/20    
     
sildenafil (pulm.hypertension) 20 20 mg PO SEE INSTRUCTIONS 06/05/18 10/28/20    
    
mg tablet       
     
flecainide 150 mg tablet 150 mg PO BID  tab 10/16/20 10/28/20    
    
    
                                  Previous Rx's    
    
    
    
 Medication  Instructions  Recorded    
     
atorvastatin 20 mg tablet 20 mg PO DAILY #90 tab 10/16/20    
    
    
    
                                    Allergies    
    
    
    
Allergy/AdvReac Type Severity Reaction Status Date / Time    
     
doxycycline Allergy Unknown RASH Verified 20 10:23    
     
fenoprofen AdvReac Unknown MOUTH AND Verified 20 10:23    
    
   NOSE      
    
   SWELLING      
    
   WITH      
    
   FENOPROFEN      
    
   (NALFON)      
    
    
    
    
Review of Systems    
Constitutional    
Constitutional: Denies chills, Denies fever(s) and Denies weakness    
Eyes    
Comments: No visual changes.    
ENT    
Ears, Nose, Mouth, and Throat: Denies neck pain and Denies sore throat    
Cardiovascular    
Cardiovascular: Denies chest pain, Denies irregular heart rhythm, Denies   
lightheadedness and Denies dyspnea    
Respiratory    
Respiratory: Denies cough and Denies dyspnea    
Gastrointestinal    
Gastrointestinal: Denies abdominal pain, Denies nausea and Denies vomiting    
Musculoskeletal    
Musculoskeletal: Reports as per HPI, Reports back pain and Denies neck pain    
Integumentary/Breasts    
Skin/Breast: Denies pruritus, Denies erythema and Denies rash    
Neurologic    
Neurologic: Denies weakness    
    
Patient History    
Medical History (Reviewed 20 @ 14:29 by Sung Steen MD)    
    
Actinic keratosis    
Atrial fibrillation    
Terry's syndrome ()    
Chronic back pain    
Chronic hepatitis C    
Erectile dysfunction    
Hearing deficit (~)    
Hyperlipidemia    
Hypertension    
Irritable bowel syndrome    
Lumbar disc herniation with radiculopathy (~2018)    
Psoriasis    
Skin cancer    
Strain of lumbar region (~2018)    
    
    
Surgical History (Reviewed 20 @ 14:29 by Sung Steen MD)    
    
Anesthesia    
H/O colonoscopy    
H/O hernia repair    
History of ankle surgery (~)    
History of arthroscopic knee surgery    
History of back surgery (~)    
History of cardiac radiofrequency ablation ()    
S/P right knee arthroscopy    
Status post arthroscopy    
Status post cholecystectomy    
    
    
Family History (Reviewed 20 @ 14:29 by Sung Steen MD)    
Grandmother      Stroke    
   Diabetes mellitus    
    
    
Social History (Reviewed 20 @ 14:29 by Sung Steen MD)    
household members:  significant
[id number]|2020 10:28:00|2020 11:07:00|DI.RAD.S_ITS|XUEF|Imaging|[id number]|"PROCEDURE:  XR CHEST 1V

## 2020-12-02 NOTE — ED.GENADULT
"HPI - General Adult
General
Chief complaint: Hypertension
Stated complaint: High Blood Pressure
Time Seen by Provider: 20 10:20
Source: patient
Mode of arrival: Ambulatory
History of Present Illness
HPI narrative: The patient presents with a concern with his blood pressure.  He takes losartan daily.  He has had his morning medications.  His blood pressure is 190 systolic at home.  He describes his blood pressure normally as 120-130 systolic.  
Of note he is on flecainide for AFib.  He has no chest pain or palpitations.  He denies dyspnea.  He has no headache, no visual changes or confusion.  He denies recent illness.  He is complaining of right mid back pain.  Pain was onset this morning. 
 He has no associated abdominal pain.  He has no dysuria or hematuria.  He has no genital pain.  He has pain with motion, he can improve the pain with positioning how he rests.
Related Data
Home Medications

 Medication  Instructions  Recorded  Confirmed
losartan 50 mg PO DAILY #0 08/29/17 10/28/20
meloxicam [Mobic] 15 mg PO SEEINSTR 05/30/18 10/28/20
sildenafil (pulm.hypertension) 20 20 mg PO SEE INSTRUCTIONS 06/05/18 10/28/20
mg tablet   
flecainide 150 mg tablet 150 mg PO BID  tab 10/16/20 10/28/20

Previous Rx's

 Medication  Instructions  Recorded
atorvastatin 20 mg tablet 20 mg PO DAILY #90 tab 10/16/20


Allergies

Allergy/AdvReac Type Severity Reaction Status Date / Time
doxycycline Allergy Unknown RASH Verified 20 10:23
fenoprofen AdvReac Unknown MOUTH AND Verified 20 10:23
   NOSE  
   SWELLING  
   WITH  
   FENOPROFEN  
   (NALFON)  



Review of Systems
Constitutional
Constitutional: Denies chills, Denies fever(s) and Denies weakness
Eyes
Comments: No visual changes.
ENT
Ears, Nose, Mouth, and Throat: Denies neck pain and Denies sore throat
Cardiovascular
Cardiovascular: Denies chest pain, Denies irregular heart rhythm, Denies lightheadedness and Denies dyspnea
Respiratory
Respiratory: Denies cough and Denies dyspnea
Gastrointestinal
Gastrointestinal: Denies abdominal pain, Denies nausea and Denies vomiting
Musculoskeletal
Musculoskeletal: Reports as per HPI, Reports back pain and Denies neck pain
Integumentary/Breasts
Skin/Breast: Denies pruritus, Denies erythema and Denies rash
Neurologic
Neurologic: Denies weakness

Patient History
Medical History (Reviewed 20 @ 14:29 by Sung Steen MD)

Actinic keratosis
Atrial fibrillation
Terry's syndrome ()
Chronic back pain
Chronic hepatitis C
Erectile dysfunction
Hearing deficit (~)
Hyperlipidemia
Hypertension
Irritable bowel syndrome
Lumbar disc herniation with radiculopathy (~2018)
Psoriasis
Skin cancer
Strain of lumbar region (~2018)


Surgical History (Reviewed 20 @ 14:29 by Sung Steen MD)

Anesthesia
H/O colonoscopy
H/O hernia repair
History of ankle surgery (~)
History of arthroscopic knee surgery
History of back surgery (~)
History of cardiac radiofrequency ablation ()
S/P right knee arthroscopy
Status post arthroscopy
Status post cholecystectomy


Family History (Reviewed 20 @ 14:29 by Sung Steen MD)
Grandmother   
 Stroke
 Diabetes mellitus


Social History (Reviewed 20 @ 14:29 by Sung Steen MD)
household members:  significant other 
Smoking Status:  Former smoker 
alcohol intake:  never 


Smoking Status: Former smoker
alcohol intake frequency: 3 or more drinks per day
Substance Use Type: does not use

Exam
Initial Vital Signs
Initial Vital Signs: Vital Signs

Pulse Rate  78   20 10:15
Blood Pressure  192/91 H  20 10:15
Pulse Oximetry  96   20 10:15


Const
General: cooperative and well developed
Nutritional Appearance: well nourished
[facility]
Head: normocephalic and atraumatic
Eyes
General: appearance normal, both eyes and all related structures
Eyelids: eyelids normal
Conjunctivae: conjunctivae normal
Sclera: sclerae normal
Pupils
[id number]|2020 11:28:40|2020 11:28:40|PC.NURSE||||"pt here with c/o pain in mid R back area under shoulder blade, before coming in pt took BP and noticed it was elevated. Pt denies chest pain."

## 2020-12-31 ENCOUNTER — HOSPITAL ENCOUNTER (EMERGENCY)
Age: 70
Discharge: HOME | End: 2020-12-31
Payer: MEDICARE

## 2020-12-31 VITALS
HEART RATE: 70 BPM | RESPIRATION RATE: 14 BRPM | OXYGEN SATURATION: 95 % | SYSTOLIC BLOOD PRESSURE: 171 MMHG | DIASTOLIC BLOOD PRESSURE: 81 MMHG

## 2020-12-31 VITALS — OXYGEN SATURATION: 98 % | HEART RATE: 71 BPM | RESPIRATION RATE: 14 BRPM

## 2020-12-31 VITALS
TEMPERATURE: 97.52 F | SYSTOLIC BLOOD PRESSURE: 205 MMHG | DIASTOLIC BLOOD PRESSURE: 100 MMHG | RESPIRATION RATE: 16 BRPM | HEART RATE: 83 BPM | OXYGEN SATURATION: 98 %

## 2020-12-31 VITALS
OXYGEN SATURATION: 96 % | HEART RATE: 65 BPM | DIASTOLIC BLOOD PRESSURE: 81 MMHG | RESPIRATION RATE: 11 BRPM | SYSTOLIC BLOOD PRESSURE: 152 MMHG

## 2020-12-31 VITALS
HEART RATE: 71 BPM | RESPIRATION RATE: 18 BRPM | SYSTOLIC BLOOD PRESSURE: 158 MMHG | DIASTOLIC BLOOD PRESSURE: 83 MMHG | OXYGEN SATURATION: 97 %

## 2020-12-31 VITALS — SYSTOLIC BLOOD PRESSURE: 181 MMHG | DIASTOLIC BLOOD PRESSURE: 86 MMHG

## 2020-12-31 VITALS
HEART RATE: 67 BPM | OXYGEN SATURATION: 97 % | RESPIRATION RATE: 15 BRPM | SYSTOLIC BLOOD PRESSURE: 173 MMHG | DIASTOLIC BLOOD PRESSURE: 85 MMHG

## 2020-12-31 VITALS — DIASTOLIC BLOOD PRESSURE: 98 MMHG | SYSTOLIC BLOOD PRESSURE: 201 MMHG

## 2020-12-31 VITALS
HEART RATE: 67 BPM | DIASTOLIC BLOOD PRESSURE: 83 MMHG | SYSTOLIC BLOOD PRESSURE: 176 MMHG | OXYGEN SATURATION: 96 % | RESPIRATION RATE: 15 BRPM

## 2020-12-31 VITALS — DIASTOLIC BLOOD PRESSURE: 76 MMHG | SYSTOLIC BLOOD PRESSURE: 169 MMHG

## 2020-12-31 DIAGNOSIS — E78.5: ICD-10-CM

## 2020-12-31 DIAGNOSIS — R07.9: ICD-10-CM

## 2020-12-31 DIAGNOSIS — I48.91: ICD-10-CM

## 2020-12-31 DIAGNOSIS — I10: Primary | ICD-10-CM

## 2020-12-31 LAB
ADD MANUAL DIFF / SLIDE REVIEW: NO
ALBUMIN SERPL-MCNC: 4.3 G/DL (ref 3.5–5)
ALBUMIN/GLOB SERPL: 1.3 {RATIO} (ref 1–2.8)
ALP SERPL-CCNC: 102 U/L (ref 38–126)
ALT SERPL-CCNC: 30 IU/L (ref ?–50)
BUN SERPL-MCNC: 18 MG/DL (ref 9–20)
CALCIUM SERPL-MCNC: 9.2 MG/DL (ref 8.4–10.2)
CHLORIDE SERPL-SCNC: 104 MMOL/L (ref 98–107)
CK SERPL-CCNC: 91 U/L (ref 55–170)
CKMB % RELATIVE INDEX: (no result) % (ref 1.5–5)
CO2 SERPL-SCNC: 27 MMOL/L (ref 22–32)
ESTIMATED GLOMERULAR FILT RATE: 57.3 ML/MIN (ref 60–?)
GLOBULIN SER CALC-MCNC: 3.2 G/DL (ref 1.7–4.1)
GLUCOSE SERPL-MCNC: 168 MG/DL (ref 80–110)
HEMATOCRIT: 45.6 % (ref 41–53)
HEMOGLOBIN: 15.6 G/DL (ref 13.5–17.5)
HEMOLYSIS: 27 (ref 0–50)
INR PPP: 1 (ref 0.9–1.3)
LYMPHOCYTES # SPEC AUTO: 1800 /UL (ref 1100–4500)
MCV RBC: 88.3 FL (ref 80–100)
MEAN CORPUSCULAR HEMOGLOBIN: 30.1 PG (ref 26–34)
MEAN CORPUSCULAR HGB CONC: 34.1 % (ref 30–36)
PLATELET COUNT: 223 X10^3/UL (ref 150–400)
POTASSIUM SERPL-SCNC: 3.8 MMOL/L (ref 3.4–5.1)
PROT SERPL-MCNC: 7.5 G/DL (ref 6.3–8.2)
PROTHROMBIN TIME: 11.7 SECONDS (ref 10.1–12.7)
PTT PARTIAL THROMBOPLASTIN TIM: 25 SECONDS (ref 26.4–36.2)
SODIUM SERPL-SCNC: 139 MMOL/L (ref 137–145)
TROPONIN I SERPL-MCNC: < 0.012 NG/ML (ref 0.01–0.03)

## 2020-12-31 PROCEDURE — 93005 ELECTROCARDIOGRAM TRACING: CPT

## 2020-12-31 PROCEDURE — 85025 COMPLETE CBC W/AUTO DIFF WBC: CPT

## 2020-12-31 PROCEDURE — 99284 EMERGENCY DEPT VISIT MOD MDM: CPT

## 2020-12-31 PROCEDURE — 84484 ASSAY OF TROPONIN QUANT: CPT

## 2020-12-31 PROCEDURE — 71045 X-RAY EXAM CHEST 1 VIEW: CPT

## 2020-12-31 PROCEDURE — 82550 ASSAY OF CK (CPK): CPT

## 2020-12-31 PROCEDURE — 81003 URINALYSIS AUTO W/O SCOPE: CPT

## 2020-12-31 PROCEDURE — 85610 PROTHROMBIN TIME: CPT

## 2020-12-31 PROCEDURE — 81015 MICROSCOPIC EXAM OF URINE: CPT

## 2020-12-31 PROCEDURE — 36415 COLL VENOUS BLD VENIPUNCTURE: CPT

## 2020-12-31 PROCEDURE — 80053 COMPREHEN METABOLIC PANEL: CPT

## 2020-12-31 PROCEDURE — 85730 THROMBOPLASTIN TIME PARTIAL: CPT

## 2020-12-31 PROCEDURE — 99283 EMERGENCY DEPT VISIT LOW MDM: CPT

## 2020-12-31 NOTE — ED_ITS
"HPI - General Adult    
General    
Chief complaint: Hypertension    
Stated complaint: states high BP    
Time Seen by Provider: 20 19:31    
Source: patient    
Mode of arrival: Ambulatory    
Limitations: no limitations    
History of Present Illness    
HPI narrative: This is a 70-year-old male comes to the emergency department with  
complaint of hypertension.  Patient denies any other symptoms.  No headaches,   
lightheadedness, has no passing-out, no chest pain or shortness of breath, no   
nausea, no vomiting, no diaphoresis.  No bowel or bladder issues.  No swelling   
in lower extremities.  Patient had his lisinopril increased to 30 mg today.  He   
took his normal 20 mg tab an additional half tab in the afternoon.  Was also   
given a prescription for clonidine and he took 0.1 mg of clonidine at 3:00 p.m.   
today.  Patient states he checked his blood pressure immediately after moving a   
bunch of wood in physical activity without any rest and was elevated to 200   
range.  It ranges between 116 and 200 on any given day but he states is typicall  
y between 145 for systolic and 83 diastolic.  Patient has AFib he is on   
flecainide, he takes finasteride and his hypertension medication.  No   
anticoagulant.  He has history of cholecystectomy and had an ablation.  No   
tobacco, alcohol or illicit the past 50 years.  Dr. Jean is his primary care   
and his cardiologist is Dr. August.     
Related Data    
                                Home Medications    
    
    
    
 Medication  Instructions  Recorded  Confirmed    
     
meloxicam [Mobic] 15 mg PO SEEINSTR 18    
     
sildenafil (pulm.hypertension) 20 20 mg PO SEE INSTRUCTIONS 18    
    
mg tablet       
     
flecainide 150 mg tablet 150 mg PO BID  tab 10/16/20 12/14/20    
    
    
                                  Previous Rx's    
    
    
    
 Medication  Instructions  Recorded    
     
atorvastatin 20 mg tablet 20 mg PO DAILY #90 tab 10/16/20    
     
ipratropium bromide 17 2 puff INHALATION Q8H PRN #12.9 g 20    
    
mcg/actuation HFA aerosol inhaler      
     
clonidine HCl 0.1 mg tablet 0.05 mg PO BID PRN #30 tab 20    
     
lisinopril 30 mg tablet 30 mg PO DAILY #30 tab 20    
    
    
    
                                    Allergies    
    
    
    
Allergy/AdvReac Type Severity Reaction Status Date / Time    
     
doxycycline Allergy Unknown RASH Verified 20 17:03    
     
fenoprofen AdvReac Unknown MOUTH AND Verified 20 17:03    
    
   NOSE      
    
   SWELLING      
    
   WITH      
    
   FENOPROFEN      
    
   (NALFON)      
    
    
    
    
Review of Systems    
Review of Systems    
ROS Unobtainable: All systems reviewed & are unremarkable except as noted in HPI  
 and below    
    
Patient History    
Medical History (Reviewed 20 @ 20:11 by Kalyn Lou DO)    
    
Actinic keratosis    
Atrial fibrillation    
Terry's syndrome ()    
Chronic back pain    
Chronic hepatitis C    
Erectile dysfunction    
Hearing deficit (~)    
Hyperlipidemia    
Hypertension    
Irritable bowel syndrome    
Lumbar disc herniation with radiculopathy (~2018)    
Psoriasis    
Skin cancer    
Strain of lumbar region (~2018)    
    
    
Surgical History (Reviewed 20 @ 20:11 by Kalyn Lou DO)    
    
Anesthesia    
H/O colonoscopy    
H/O hernia repair    
History of ankle surgery (~)    
History of arthroscopic knee surgery    
History of back surgery (~)    
History of cardiac radiofrequency ablation ()    
S/P right knee arthroscopy    
Status post arthroscopy    
Status post cholecystectomy    
    
    
Family History (Reviewed 20 @ 20:11 by Kalyn Lou DO)    
Grandmother      Stroke    
   Diabetes mellitus    
    
    
Social History (Reviewed 20 @ 20:11 by Rosita
[id number]|2020 18:47:09|2020 18:47:09|ED.SEIZURE||||"HPI - Seizure

## 2020-12-31 NOTE — ED.GENADULT
"HPI - General Adult
General
Chief complaint: Hypertension
Stated complaint: states high BP
Time Seen by Provider: 20 19:31
Source: patient
Mode of arrival: Ambulatory
Limitations: no limitations
History of Present Illness
HPI narrative: This is a 70-year-old male comes to the emergency department with complaint of hypertension.  Patient denies any other symptoms.  No headaches, lightheadedness, has no passing-out, no chest pain or shortness of breath, no nausea, no 
vomiting, no diaphoresis.  No bowel or bladder issues.  No swelling in lower extremities.  Patient had his lisinopril increased to 30 mg today.  He took his normal 20 mg tab an additional half tab in the afternoon.  Was also given a prescription for 
clonidine and he took 0.1 mg of clonidine at 3:00 p.m. today.  Patient states he checked his blood pressure immediately after moving a bunch of wood in physical activity without any rest and was elevated to 200 range.  It ranges between 116 and 200 
on any given day but he states is typically between 145 for systolic and 83 diastolic.  Patient has AFib he is on flecainide, he takes finasteride and his hypertension medication.  No anticoagulant.  He has history of cholecystectomy and had an 
ablation.  No tobacco, alcohol or illicit the past 50 years.  Dr. Jean is his primary care and his cardiologist is Dr. August. 
Related Data
Home Medications

 Medication  Instructions  Recorded  Confirmed
meloxicam [Mobic] 15 mg PO SEEINSTR 18
sildenafil (pulm.hypertension) 20 20 mg PO SEE INSTRUCTIONS 18
mg tablet   
flecainide 150 mg tablet 150 mg PO BID  tab 10/16/20 12/14/20

Previous Rx's

 Medication  Instructions  Recorded
atorvastatin 20 mg tablet 20 mg PO DAILY #90 tab 10/16/20
ipratropium bromide 17 2 puff INHALATION Q8H PRN #12.9 g 20
mcg/actuation HFA aerosol inhaler  
clonidine HCl 0.1 mg tablet 0.05 mg PO BID PRN #30 tab 20
lisinopril 30 mg tablet 30 mg PO DAILY #30 tab 20


Allergies

Allergy/AdvReac Type Severity Reaction Status Date / Time
doxycycline Allergy Unknown RASH Verified 20 17:03
fenoprofen AdvReac Unknown MOUTH AND Verified 20 17:03
   NOSE  
   SWELLING  
   WITH  
   FENOPROFEN  
   (NALFON)  



Review of Systems
Review of Systems
ROS Unobtainable: All systems reviewed & are unremarkable except as noted in HPI and below

Patient History
Medical History (Reviewed 20 @ 20:11 by Kalyn Lou DO)

Actinic keratosis
Atrial fibrillation
Terry's syndrome ()
Chronic back pain
Chronic hepatitis C
Erectile dysfunction
Hearing deficit (~)
Hyperlipidemia
Hypertension
Irritable bowel syndrome
Lumbar disc herniation with radiculopathy (~2018)
Psoriasis
Skin cancer
Strain of lumbar region (~2018)


Surgical History (Reviewed 20 @ 20:11 by Kalyn Lou DO)

Anesthesia
H/O colonoscopy
H/O hernia repair
History of ankle surgery (~)
History of arthroscopic knee surgery
History of back surgery (~)
History of cardiac radiofrequency ablation ()
S/P right knee arthroscopy
Status post arthroscopy
Status post cholecystectomy


Family History (Reviewed 20 @ 20:11 by Kalyn Lou DO)
Grandmother   
 Stroke
 Diabetes mellitus


Social History (Reviewed 20 @ 20:11 by Kalyn Lou DO)
household members:  significant other 
Smoking Status:  Former smoker 
alcohol intake:  never 


Smoking Status: Former smoker
alcohol intake frequency: 3 or more drinks per day
Substance Use Type: does not use

Exam
Narrative
Exam Narrative: GENERAL: Alert and oriented x three, well-nourished, well-appearing male in mild distress.
HEENT: Head normocephalic, atraumatic, EOMI, pupils reactive, face symmetric, moist mucous membranes
NECK: Supple, full range of motion
CARDIOVASCULAR: Regular rate and rhythm without murmurs, rubs or gallops.
RESPIRATORY: Breath sounds equal bilaterally, no wheezes rales or rhonchi.
[id number]|2020 19:01:00|2020 19:34:00|DI.RAD.S_ITS|RACHID|Imaging|[id number]|"PROCEDURE:  XR CHEST 1V

## 2021-03-09 ENCOUNTER — HOSPITAL ENCOUNTER (OUTPATIENT)
Age: 71
End: 2021-03-09
Payer: MEDICARE

## 2021-03-09 DIAGNOSIS — E78.5: ICD-10-CM

## 2021-03-09 DIAGNOSIS — Z79.899: ICD-10-CM

## 2021-03-09 DIAGNOSIS — I10: Primary | ICD-10-CM

## 2021-03-09 LAB
ADD MANUAL DIFF / SLIDE REVIEW: NO
ALBUMIN SERPL-MCNC: 4.3 G/DL (ref 3.5–5)
ALBUMIN/GLOB SERPL: 1.3 {RATIO} (ref 1–2.8)
ALP SERPL-CCNC: 99 U/L (ref 38–126)
ALT SERPL-CCNC: 25 IU/L (ref ?–50)
BUN SERPL-MCNC: 13 MG/DL (ref 9–20)
CALCIUM SERPL-MCNC: 9.1 MG/DL (ref 8.4–10.2)
CHLORIDE SERPL-SCNC: 101 MMOL/L (ref 98–107)
CHOLEST SERPL-MCNC: 242 MG/DL (ref 140–199)
CO2 SERPL-SCNC: 30 MMOL/L (ref 22–32)
ESTIMATED GLOMERULAR FILT RATE: > 60 ML/MIN (ref 60–?)
GLOBULIN SER CALC-MCNC: 3.2 G/DL (ref 1.7–4.1)
GLUCOSE SERPL-MCNC: 145 MG/DL (ref 80–110)
HBA1C MFR BLD: 6.7 % (ref 4–6)
HDLC SERPL-MCNC: 35 MG/DL (ref 40–60)
HEMATOCRIT: 45.5 % (ref 41–53)
HEMOGLOBIN: 15.4 G/DL (ref 13.5–17.5)
HEMOLYSIS: 36 (ref 0–50)
LYMPHOCYTES # SPEC AUTO: 2000 /UL (ref 1100–4500)
MCV RBC: 88.6 FL (ref 80–100)
MEAN CORPUSCULAR HEMOGLOBIN: 29.9 PG (ref 26–34)
MEAN CORPUSCULAR HGB CONC: 33.7 % (ref 30–36)
PLATELET COUNT: 256 X10^3/UL (ref 150–400)
POTASSIUM SERPL-SCNC: 4.6 MMOL/L (ref 3.4–5.1)
PROT SERPL-MCNC: 7.5 G/DL (ref 6.3–8.2)
SODIUM SERPL-SCNC: 137 MMOL/L (ref 137–145)
TRIGL SERPL-MCNC: 325 MG/DL (ref 35–150)

## 2021-03-09 PROCEDURE — 85025 COMPLETE CBC W/AUTO DIFF WBC: CPT

## 2021-03-09 PROCEDURE — 36415 COLL VENOUS BLD VENIPUNCTURE: CPT

## 2021-03-09 PROCEDURE — 83036 HEMOGLOBIN GLYCOSYLATED A1C: CPT

## 2021-03-09 PROCEDURE — 80061 LIPID PANEL: CPT

## 2021-03-09 PROCEDURE — 80053 COMPREHEN METABOLIC PANEL: CPT

## 2021-04-27 ENCOUNTER — HOSPITAL ENCOUNTER (OUTPATIENT)
Age: 71
End: 2021-04-27
Payer: MEDICARE

## 2021-04-27 DIAGNOSIS — J32.4: Primary | ICD-10-CM

## 2021-04-27 DIAGNOSIS — H69.82: ICD-10-CM

## 2021-04-27 DIAGNOSIS — R51.9: ICD-10-CM

## 2021-04-27 PROCEDURE — 70486 CT MAXILLOFACIAL W/O DYE: CPT

## 2021-06-25 ENCOUNTER — HOSPITAL ENCOUNTER (OUTPATIENT)
Age: 71
End: 2021-06-25
Payer: MEDICARE

## 2021-06-25 DIAGNOSIS — I10: Primary | ICD-10-CM

## 2021-06-25 DIAGNOSIS — E11.9: ICD-10-CM

## 2021-06-25 LAB
ADD MANUAL DIFF / SLIDE REVIEW: NO
ALBUMIN SERPL-MCNC: 3.9 G/DL (ref 3.5–5)
ALBUMIN/GLOB SERPL: 1.4 {RATIO} (ref 1–2.8)
ALP SERPL-CCNC: 94 U/L (ref 38–126)
ALT SERPL-CCNC: 23 IU/L (ref ?–50)
BUN SERPL-MCNC: 17 MG/DL (ref 9–20)
CALCIUM SERPL-MCNC: 9 MG/DL (ref 8.4–10.2)
CHLORIDE SERPL-SCNC: 105 MMOL/L (ref 98–107)
CO2 SERPL-SCNC: 28 MMOL/L (ref 22–32)
ESTIMATED GLOMERULAR FILT RATE: > 60 ML/MIN (ref 60–?)
GLOBULIN SER CALC-MCNC: 2.8 G/DL (ref 1.7–4.1)
GLUCOSE SERPL-MCNC: 110 MG/DL (ref 80–110)
HBA1C MFR BLD: 6.3 % (ref 4–6)
HEMATOCRIT: 43.2 % (ref 41–53)
HEMOGLOBIN: 14.8 G/DL (ref 13.5–17.5)
HEMOLYSIS: < 15 (ref 0–50)
LYMPHOCYTES # SPEC AUTO: 1900 /UL (ref 1100–4500)
MCV RBC: 89.3 FL (ref 80–100)
MEAN CORPUSCULAR HEMOGLOBIN: 30.6 PG (ref 26–34)
MEAN CORPUSCULAR HGB CONC: 34.2 % (ref 30–36)
PLATELET COUNT: 211 X10^3/UL (ref 150–400)
POTASSIUM SERPL-SCNC: 4.5 MMOL/L (ref 3.4–5.1)
PROT SERPL-MCNC: 6.7 G/DL (ref 6.3–8.2)
SODIUM SERPL-SCNC: 138 MMOL/L (ref 137–145)

## 2021-06-25 PROCEDURE — 85025 COMPLETE CBC W/AUTO DIFF WBC: CPT

## 2021-06-25 PROCEDURE — 80053 COMPREHEN METABOLIC PANEL: CPT

## 2021-06-25 PROCEDURE — 83036 HEMOGLOBIN GLYCOSYLATED A1C: CPT

## 2021-06-25 PROCEDURE — 36415 COLL VENOUS BLD VENIPUNCTURE: CPT

## 2022-02-10 ENCOUNTER — HOSPITAL ENCOUNTER
Age: 72
End: 2022-02-10
Payer: MEDICARE

## 2022-02-10 DIAGNOSIS — Z20.822: Primary | ICD-10-CM

## 2022-02-10 PROCEDURE — 87635 SARS-COV-2 COVID-19 AMP PRB: CPT

## 2022-02-23 ENCOUNTER — HOSPITAL ENCOUNTER (OUTPATIENT)
Age: 72
End: 2022-02-23
Payer: MEDICARE

## 2022-02-23 DIAGNOSIS — R06.02: Primary | ICD-10-CM

## 2022-02-23 PROCEDURE — 71046 X-RAY EXAM CHEST 2 VIEWS: CPT

## 2022-05-03 ENCOUNTER — HOSPITAL ENCOUNTER (OUTPATIENT)
Age: 72
End: 2022-05-03
Payer: MEDICARE

## 2022-05-03 DIAGNOSIS — E78.5: ICD-10-CM

## 2022-05-03 DIAGNOSIS — I70.238: ICD-10-CM

## 2022-05-03 DIAGNOSIS — I10: ICD-10-CM

## 2022-05-03 DIAGNOSIS — I70.229: ICD-10-CM

## 2022-05-03 DIAGNOSIS — E11.9: Primary | ICD-10-CM

## 2022-05-03 LAB
ADD MANUAL DIFF / SLIDE REVIEW: NO
ALBUMIN SERPL-MCNC: 4.1 G/DL (ref 3.5–5)
ALBUMIN/GLOB SERPL: 1.4 {RATIO} (ref 1–2.8)
ALP SERPL-CCNC: 85 U/L (ref 38–126)
ALT SERPL-CCNC: 18 IU/L (ref ?–50)
BUN SERPL-MCNC: 13 MG/DL (ref 9–20)
CALCIUM SERPL-MCNC: 8.7 MG/DL (ref 8.4–10.2)
CHLORIDE SERPL-SCNC: 104 MMOL/L (ref 98–107)
CHOLEST SERPL-MCNC: 254 MG/DL (ref 140–199)
CO2 SERPL-SCNC: 29 MMOL/L (ref 22–32)
ESTIMATED GLOMERULAR FILT RATE: > 60 ML/MIN (ref 60–?)
GLOBULIN SER CALC-MCNC: 3 G/DL (ref 1.7–4.1)
GLUCOSE SERPL-MCNC: 110 MG/DL (ref 80–110)
HBA1C MFR BLD: 6.4 % (ref 4–6)
HDLC SERPL-MCNC: 37 MG/DL (ref 40–60)
HEMATOCRIT: 42.4 % (ref 41–53)
HEMOGLOBIN: 14.7 G/DL (ref 13.5–17.5)
HEMOLYSIS: < 15 (ref 0–50)
LYMPHOCYTES # SPEC AUTO: 1900 /UL (ref 1100–4500)
MCV RBC: 88.1 FL (ref 80–100)
MEAN CORPUSCULAR HEMOGLOBIN: 30.6 PG (ref 26–34)
MEAN CORPUSCULAR HGB CONC: 34.7 % (ref 30–36)
PLATELET COUNT: 263 X10^3/UL (ref 150–400)
POTASSIUM SERPL-SCNC: 4.7 MMOL/L (ref 3.4–5.1)
PROT SERPL-MCNC: 7.1 G/DL (ref 6.3–8.2)
SODIUM SERPL-SCNC: 140 MMOL/L (ref 137–145)
TRIGL SERPL-MCNC: 232 MG/DL (ref 35–150)

## 2022-05-03 PROCEDURE — 36415 COLL VENOUS BLD VENIPUNCTURE: CPT

## 2022-05-03 PROCEDURE — 80061 LIPID PANEL: CPT

## 2022-05-03 PROCEDURE — 85025 COMPLETE CBC W/AUTO DIFF WBC: CPT

## 2022-05-03 PROCEDURE — 80053 COMPREHEN METABOLIC PANEL: CPT

## 2022-05-03 PROCEDURE — 83036 HEMOGLOBIN GLYCOSYLATED A1C: CPT

## 2022-09-19 ENCOUNTER — HOSPITAL ENCOUNTER (OUTPATIENT)
Age: 72
End: 2022-09-19
Payer: MEDICARE

## 2022-09-19 DIAGNOSIS — N40.0: ICD-10-CM

## 2022-09-19 DIAGNOSIS — I10: ICD-10-CM

## 2022-09-19 DIAGNOSIS — I48.91: ICD-10-CM

## 2022-09-19 DIAGNOSIS — E11.9: Primary | ICD-10-CM

## 2022-09-19 DIAGNOSIS — E78.5: ICD-10-CM

## 2022-09-19 LAB
ALBUMIN SERPL-MCNC: 4.2 G/DL (ref 3.5–5)
ALBUMIN/GLOB SERPL: 1.2 {RATIO} (ref 1–2.8)
ALP SERPL-CCNC: 92 U/L (ref 38–126)
ALT SERPL-CCNC: 20 IU/L (ref ?–50)
BUN SERPL-MCNC: 14 MG/DL (ref 9–20)
CALCIUM SERPL-MCNC: 8.9 MG/DL (ref 8.4–10.2)
CHLORIDE SERPL-SCNC: 101 MMOL/L (ref 98–107)
CHOLEST SERPL-MCNC: 266 MG/DL (ref 140–199)
CO2 SERPL-SCNC: 31 MMOL/L (ref 22–32)
ESTIMATED GLOMERULAR FILT RATE: > 60 ML/MIN (ref 60–?)
GLOBULIN SER CALC-MCNC: 3.4 G/DL (ref 1.7–4.1)
GLUCOSE SERPL-MCNC: 120 MG/DL (ref 80–110)
HDLC SERPL-MCNC: 41 MG/DL (ref 40–60)
HEMOLYSIS: < 15 (ref 0–50)
MICROALBUMI CREATININ RATIO UR: 5.7 UG/MG CR (ref ?–30)
POTASSIUM SERPL-SCNC: 4.3 MMOL/L (ref 3.4–5.1)
PROT SERPL-MCNC: 7.6 G/DL (ref 6.3–8.2)
SODIUM SERPL-SCNC: 140 MMOL/L (ref 137–145)
TRIGL SERPL-MCNC: 142 MG/DL (ref 35–150)

## 2022-09-19 PROCEDURE — 82570 ASSAY OF URINE CREATININE: CPT

## 2022-09-19 PROCEDURE — 85025 COMPLETE CBC W/AUTO DIFF WBC: CPT

## 2022-09-19 PROCEDURE — 36415 COLL VENOUS BLD VENIPUNCTURE: CPT

## 2022-09-19 PROCEDURE — 80053 COMPREHEN METABOLIC PANEL: CPT

## 2022-09-19 PROCEDURE — 84154 ASSAY OF PSA FREE: CPT

## 2022-09-19 PROCEDURE — 85007 BL SMEAR W/DIFF WBC COUNT: CPT

## 2022-09-19 PROCEDURE — 84153 ASSAY OF PSA TOTAL: CPT

## 2022-09-19 PROCEDURE — 82043 UR ALBUMIN QUANTITATIVE: CPT

## 2022-09-19 PROCEDURE — 80061 LIPID PANEL: CPT

## 2022-09-20 LAB — PSA SERPL-MCNC: 2.1 NG/ML (ref 0–4)

## 2022-09-21 LAB
ADD MANUAL DIFF / SLIDE REVIEW: YES
ATYPICAL LYMPHOCYTES PERCENT: 1 %
BASOPHILS NFR SPEC MANUAL: 2 % (ref 0–1)
EOSINOPHILS PERCENT MANUAL: 1 % (ref 2–4)
HEMATOCRIT: 40.1 % (ref 41–53)
HEMOGLOBIN: 14 G/DL (ref 13.5–17.5)
LYMPHOCYTES PERCENT MANUAL: 27 % (ref 25–45)
MCV RBC: 87.7 FL (ref 80–100)
MEAN CORPUSCULAR HEMOGLOBIN: 30.6 PG (ref 26–34)
MEAN CORPUSCULAR HGB CONC: 34.9 % (ref 30–36)
MONOCYTES PERCENT MANUAL: 5 % (ref 2–11)
NEUTROPHILS ABSOLUTE MANUAL: 5120 /UL (ref 3000–5900)
NEUTS SEG NFR BLD: 64 % (ref 38–70)
PLATELET COUNT: 273 X10^3/UL (ref 150–400)
TOTAL CELLS COUNTED: 100

## 2023-02-08 ENCOUNTER — HOSPITAL ENCOUNTER (OUTPATIENT)
Age: 73
End: 2023-02-08
Payer: MEDICARE

## 2023-02-08 DIAGNOSIS — M25.559: ICD-10-CM

## 2023-02-08 DIAGNOSIS — M51.16: ICD-10-CM

## 2023-02-08 DIAGNOSIS — M47.26: ICD-10-CM

## 2023-02-08 DIAGNOSIS — M54.50: ICD-10-CM

## 2023-02-08 DIAGNOSIS — M47.27: ICD-10-CM

## 2023-02-08 DIAGNOSIS — M46.1: Primary | ICD-10-CM

## 2023-02-08 PROCEDURE — 72100 X-RAY EXAM L-S SPINE 2/3 VWS: CPT

## 2023-02-08 PROCEDURE — 73521 X-RAY EXAM HIPS BI 2 VIEWS: CPT

## 2023-02-09 ENCOUNTER — HOSPITAL ENCOUNTER (OUTPATIENT)
Age: 73
End: 2023-02-09
Payer: MEDICARE

## 2023-02-09 DIAGNOSIS — E11.9: ICD-10-CM

## 2023-02-09 DIAGNOSIS — M54.40: ICD-10-CM

## 2023-02-09 DIAGNOSIS — E78.5: ICD-10-CM

## 2023-02-09 DIAGNOSIS — I48.91: Primary | ICD-10-CM

## 2023-02-09 DIAGNOSIS — I10: ICD-10-CM

## 2023-02-09 LAB
ADD MANUAL DIFF / SLIDE REVIEW: NO
ALBUMIN SERPL-MCNC: 3.9 G/DL (ref 3.5–5)
ALBUMIN/GLOB SERPL: 1.2 {RATIO} (ref 1–2.8)
ALP SERPL-CCNC: 89 U/L (ref 38–126)
ALT SERPL-CCNC: 27 IU/L (ref ?–50)
BUN SERPL-MCNC: 16 MG/DL (ref 9–20)
CALCIUM SERPL-MCNC: 8.4 MG/DL (ref 8.4–10.2)
CHLORIDE SERPL-SCNC: 101 MMOL/L (ref 98–107)
CHOLEST SERPL-MCNC: 262 MG/DL (ref 140–199)
CO2 SERPL-SCNC: 31 MMOL/L (ref 22–32)
ESTIMATED GLOMERULAR FILT RATE: > 60 ML/MIN (ref 60–?)
GLOBULIN SER CALC-MCNC: 3.3 G/DL (ref 1.7–4.1)
GLUCOSE SERPL-MCNC: 103 MG/DL (ref 80–110)
HBA1C MFR BLD: 6.8 % (ref 4–6)
HDLC SERPL-MCNC: 35 MG/DL (ref 40–60)
HEMATOCRIT: 42.7 % (ref 41–53)
HEMOGLOBIN: 14.4 G/DL (ref 13.5–17.5)
HEMOLYSIS: < 15 (ref 0–50)
LYMPHOCYTES # SPEC AUTO: 2100 /UL (ref 1100–4500)
MCV RBC: 87.6 FL (ref 80–100)
MEAN CORPUSCULAR HEMOGLOBIN: 29.5 PG (ref 26–34)
MEAN CORPUSCULAR HGB CONC: 33.7 % (ref 30–36)
PLATELET COUNT: 250 X10^3/UL (ref 150–400)
POTASSIUM SERPL-SCNC: 3.9 MMOL/L (ref 3.4–5.1)
PROT SERPL-MCNC: 7.2 G/DL (ref 6.3–8.2)
SODIUM SERPL-SCNC: 140 MMOL/L (ref 137–145)
TRIGL SERPL-MCNC: 312 MG/DL (ref 35–150)

## 2023-02-09 PROCEDURE — 83036 HEMOGLOBIN GLYCOSYLATED A1C: CPT

## 2023-02-09 PROCEDURE — 85025 COMPLETE CBC W/AUTO DIFF WBC: CPT

## 2023-02-09 PROCEDURE — 80061 LIPID PANEL: CPT

## 2023-02-09 PROCEDURE — 36415 COLL VENOUS BLD VENIPUNCTURE: CPT

## 2023-02-09 PROCEDURE — 85651 RBC SED RATE NONAUTOMATED: CPT

## 2023-02-09 PROCEDURE — 80053 COMPREHEN METABOLIC PANEL: CPT

## 2023-07-11 ENCOUNTER — HOSPITAL ENCOUNTER (OUTPATIENT)
Age: 73
End: 2023-07-11
Payer: MEDICARE

## 2023-07-11 DIAGNOSIS — I10: Primary | ICD-10-CM

## 2023-07-11 LAB
BUN SERPL-MCNC: 23 MG/DL (ref 9–20)
CALCIUM SERPL-MCNC: 8.9 MG/DL (ref 8.4–10.2)
CHLORIDE SERPL-SCNC: 101 MMOL/L (ref 98–107)
CO2 SERPL-SCNC: 32 MMOL/L (ref 22–32)
ESTIMATED GLOMERULAR FILT RATE: 57 ML/MIN (ref 60–?)
GLUCOSE SERPL-MCNC: 131 MG/DL (ref 80–110)
HEMOLYSIS: < 15 (ref 0–50)
POTASSIUM SERPL-SCNC: 3.8 MMOL/L (ref 3.4–5.1)
SODIUM SERPL-SCNC: 139 MMOL/L (ref 137–145)

## 2023-07-11 PROCEDURE — 36415 COLL VENOUS BLD VENIPUNCTURE: CPT

## 2023-07-11 PROCEDURE — 80048 BASIC METABOLIC PNL TOTAL CA: CPT

## 2023-09-05 ENCOUNTER — HOSPITAL ENCOUNTER (OUTPATIENT)
Age: 73
End: 2023-09-05
Payer: MEDICARE

## 2023-09-05 DIAGNOSIS — I10: ICD-10-CM

## 2023-09-05 DIAGNOSIS — E78.5: Primary | ICD-10-CM

## 2023-09-05 DIAGNOSIS — N40.1: ICD-10-CM

## 2023-09-05 DIAGNOSIS — Z11.59: ICD-10-CM

## 2023-09-05 LAB
ADD MANUAL DIFF / SLIDE REVIEW: NO
ALBUMIN SERPL-MCNC: 4 G/DL (ref 3.5–5)
ALBUMIN/GLOB SERPL: 1.3 {RATIO} (ref 1–2.8)
ALP SERPL-CCNC: 89 U/L (ref 38–126)
ALT SERPL-CCNC: 21 IU/L (ref ?–50)
BUN SERPL-MCNC: 10 MG/DL (ref 9–20)
CALCIUM SERPL-MCNC: 8.6 MG/DL (ref 8.4–10.2)
CHLORIDE SERPL-SCNC: 101 MMOL/L (ref 98–107)
CO2 SERPL-SCNC: 31 MMOL/L (ref 22–32)
ESTIMATED GLOMERULAR FILT RATE: > 60 ML/MIN (ref 60–?)
GLOBULIN SER CALC-MCNC: 3 G/DL (ref 1.7–4.1)
GLUCOSE SERPL-MCNC: 112 MG/DL (ref 80–110)
HCV AB SERPL QL IA: REACTIVE S/C
HEMATOCRIT: 40.9 % (ref 41–53)
HEMOGLOBIN: 14.1 G/DL (ref 13.5–17.5)
HEMOLYSIS: < 15 (ref 0–50)
LYMPHOCYTES # SPEC AUTO: 1700 /UL (ref 1100–4500)
MCV RBC: 88.9 FL (ref 80–100)
MEAN CORPUSCULAR HEMOGLOBIN: 30.7 PG (ref 26–34)
MEAN CORPUSCULAR HGB CONC: 34.5 % (ref 30–36)
PLATELET COUNT: 298 X10^3/UL (ref 150–400)
POTASSIUM SERPL-SCNC: 3.6 MMOL/L (ref 3.4–5.1)
PROT SERPL-MCNC: 7 G/DL (ref 6.3–8.2)
SODIUM SERPL-SCNC: 138 MMOL/L (ref 137–145)
T4 FREE SERPL-MCNC: 1.09 NG/DL (ref 0.78–2.19)
TSH SERPL DL<=0.05 MIU/L-ACNC: 1.52 UIU/ML (ref 0.47–4.68)

## 2023-09-05 PROCEDURE — 80053 COMPREHEN METABOLIC PANEL: CPT

## 2023-09-05 PROCEDURE — 84154 ASSAY OF PSA FREE: CPT

## 2023-09-05 PROCEDURE — 80061 LIPID PANEL: CPT

## 2023-09-05 PROCEDURE — 86803 HEPATITIS C AB TEST: CPT

## 2023-09-05 PROCEDURE — 84439 ASSAY OF FREE THYROXINE: CPT

## 2023-09-05 PROCEDURE — 84153 ASSAY OF PSA TOTAL: CPT

## 2023-09-05 PROCEDURE — 85025 COMPLETE CBC W/AUTO DIFF WBC: CPT

## 2023-09-05 PROCEDURE — 87522 HEPATITIS C REVRS TRNSCRPJ: CPT

## 2023-09-05 PROCEDURE — 36415 COLL VENOUS BLD VENIPUNCTURE: CPT

## 2023-09-05 PROCEDURE — 84443 ASSAY THYROID STIM HORMONE: CPT

## 2023-09-06 LAB
CHOLEST SERPL-MCNC: 229 MG/DL (ref 100–199)
COMMENT: (no result)
HDLC SERPL-MCNC: 41 MG/DL (ref 39–?)
LDLC SERPL CALC-MCNC: 164 MG/DL (ref 0–99)
TRIGL SERPL-MCNC: 134 MG/DL (ref 0–149)

## 2023-09-07 LAB
HCV RNA SERPL NAA+PROBE-LOG IU: (no result) {LOG_IU}/ML
PSA SERPL-MCNC: 1.4 NG/ML (ref 0–4)

## 2023-09-10 ENCOUNTER — HOSPITAL ENCOUNTER (INPATIENT)
Dept: HOSPITAL 73 - ED | Age: 73
LOS: 1 days | Discharge: ANOTHER HOSPITAL | DRG: 64 | End: 2023-09-11
Payer: MEDICARE

## 2023-09-10 VITALS
DIASTOLIC BLOOD PRESSURE: 87 MMHG | RESPIRATION RATE: 22 BRPM | OXYGEN SATURATION: 99 % | SYSTOLIC BLOOD PRESSURE: 178 MMHG | HEART RATE: 80 BPM

## 2023-09-10 VITALS
DIASTOLIC BLOOD PRESSURE: 91 MMHG | HEART RATE: 81 BPM | OXYGEN SATURATION: 99 % | RESPIRATION RATE: 23 BRPM | SYSTOLIC BLOOD PRESSURE: 182 MMHG

## 2023-09-10 VITALS — BODY MASS INDEX: 26 KG/M2 | BODY MASS INDEX: 26.2 KG/M2

## 2023-09-10 VITALS
OXYGEN SATURATION: 93 % | RESPIRATION RATE: 18 BRPM | DIASTOLIC BLOOD PRESSURE: 122 MMHG | HEART RATE: 90 BPM | SYSTOLIC BLOOD PRESSURE: 197 MMHG

## 2023-09-10 VITALS
SYSTOLIC BLOOD PRESSURE: 182 MMHG | TEMPERATURE: 97 F | OXYGEN SATURATION: 100 % | HEART RATE: 80 BPM | RESPIRATION RATE: 18 BRPM | DIASTOLIC BLOOD PRESSURE: 91 MMHG

## 2023-09-10 VITALS — HEART RATE: 79 BPM | RESPIRATION RATE: 26 BRPM | OXYGEN SATURATION: 100 %

## 2023-09-10 VITALS
RESPIRATION RATE: 18 BRPM | DIASTOLIC BLOOD PRESSURE: 76 MMHG | TEMPERATURE: 98.06 F | HEART RATE: 77 BPM | SYSTOLIC BLOOD PRESSURE: 153 MMHG | OXYGEN SATURATION: 98 %

## 2023-09-10 VITALS
HEART RATE: 77 BPM | DIASTOLIC BLOOD PRESSURE: 91 MMHG | OXYGEN SATURATION: 100 % | RESPIRATION RATE: 20 BRPM | SYSTOLIC BLOOD PRESSURE: 186 MMHG

## 2023-09-10 VITALS
OXYGEN SATURATION: 95 % | SYSTOLIC BLOOD PRESSURE: 169 MMHG | DIASTOLIC BLOOD PRESSURE: 80 MMHG | HEART RATE: 73 BPM | RESPIRATION RATE: 13 BRPM

## 2023-09-10 VITALS — RESPIRATION RATE: 25 BRPM | HEART RATE: 80 BPM | OXYGEN SATURATION: 100 %

## 2023-09-10 VITALS
DIASTOLIC BLOOD PRESSURE: 84 MMHG | OXYGEN SATURATION: 100 % | SYSTOLIC BLOOD PRESSURE: 191 MMHG | RESPIRATION RATE: 22 BRPM | HEART RATE: 75 BPM

## 2023-09-10 DIAGNOSIS — N40.0: ICD-10-CM

## 2023-09-10 DIAGNOSIS — I10: ICD-10-CM

## 2023-09-10 DIAGNOSIS — I63.511: Primary | ICD-10-CM

## 2023-09-10 DIAGNOSIS — I61.8: ICD-10-CM

## 2023-09-10 DIAGNOSIS — R29.702: ICD-10-CM

## 2023-09-10 DIAGNOSIS — G89.29: ICD-10-CM

## 2023-09-10 DIAGNOSIS — I63.211: ICD-10-CM

## 2023-09-10 DIAGNOSIS — M54.42: ICD-10-CM

## 2023-09-10 DIAGNOSIS — K21.9: ICD-10-CM

## 2023-09-10 DIAGNOSIS — G81.94: ICD-10-CM

## 2023-09-10 DIAGNOSIS — E78.5: ICD-10-CM

## 2023-09-10 DIAGNOSIS — I48.0: ICD-10-CM

## 2023-09-10 LAB
ADD MANUAL DIFF / SLIDE REVIEW: NO
ALBUMIN SERPL-MCNC: 4.2 G/DL (ref 3.5–5)
ALBUMIN/GLOB SERPL: 1.3 {RATIO} (ref 1–2.8)
ALP SERPL-CCNC: 92 U/L (ref 38–126)
ALT SERPL-CCNC: 21 IU/L (ref ?–50)
BUN SERPL-MCNC: 13 MG/DL (ref 9–20)
CALCIUM SERPL-MCNC: 8.7 MG/DL (ref 8.4–10.2)
CHLORIDE SERPL-SCNC: 103 MMOL/L (ref 98–107)
CK SERPL-CCNC: 350 U/L (ref 55–170)
CO2 SERPL-SCNC: 24 MMOL/L (ref 22–32)
ESTIMATED GLOMERULAR FILT RATE: > 60 ML/MIN (ref 60–?)
ETHANOL SERPL-MCNC: < 10 MG/DL
GLOBULIN SER CALC-MCNC: 3.3 G/DL (ref 1.7–4.1)
GLUCOSE SERPL-MCNC: 131 MG/DL (ref 80–110)
HEMATOCRIT: 40.1 % (ref 41–53)
HEMOGLOBIN: 13.5 G/DL (ref 13.5–17.5)
HEMOLYSIS: < 15 (ref 0–50)
LIPASE SERPL-CCNC: 52 U/L (ref 23–300)
LYMPHOCYTES # SPEC AUTO: 2100 /UL (ref 1100–4500)
MCV RBC: 89 FL (ref 80–100)
MEAN CORPUSCULAR HEMOGLOBIN: 30.1 PG (ref 26–34)
MEAN CORPUSCULAR HGB CONC: 33.8 % (ref 30–36)
PLATELET COUNT: 285 X10^3/UL (ref 150–400)
POTASSIUM SERPL-SCNC: 3.4 MMOL/L (ref 3.4–5.1)
PROT SERPL-MCNC: 7.5 G/DL (ref 6.3–8.2)
SODIUM SERPL-SCNC: 136 MMOL/L (ref 137–145)
TROPONIN I SERPL-MCNC: < 0.012 NG/ML (ref 0.01–0.03)

## 2023-09-10 PROCEDURE — 70496 CT ANGIOGRAPHY HEAD: CPT

## 2023-09-10 PROCEDURE — 70498 CT ANGIOGRAPHY NECK: CPT

## 2023-09-10 PROCEDURE — 80053 COMPREHEN METABOLIC PANEL: CPT

## 2023-09-10 PROCEDURE — 85025 COMPLETE CBC W/AUTO DIFF WBC: CPT

## 2023-09-10 PROCEDURE — 82962 GLUCOSE BLOOD TEST: CPT

## 2023-09-10 PROCEDURE — 83690 ASSAY OF LIPASE: CPT

## 2023-09-10 PROCEDURE — 70450 CT HEAD/BRAIN W/O DYE: CPT

## 2023-09-10 PROCEDURE — 82550 ASSAY OF CK (CPK): CPT

## 2023-09-10 PROCEDURE — 36415 COLL VENOUS BLD VENIPUNCTURE: CPT

## 2023-09-10 PROCEDURE — 84484 ASSAY OF TROPONIN QUANT: CPT

## 2023-09-10 PROCEDURE — 99285 EMERGENCY DEPT VISIT HI MDM: CPT

## 2023-09-10 PROCEDURE — 83605 ASSAY OF LACTIC ACID: CPT

## 2023-09-10 PROCEDURE — 85610 PROTHROMBIN TIME: CPT

## 2023-09-10 PROCEDURE — 93005 ELECTROCARDIOGRAM TRACING: CPT

## 2023-09-10 PROCEDURE — 94762 N-INVAS EAR/PLS OXIMTRY CONT: CPT

## 2023-09-10 PROCEDURE — 80048 BASIC METABOLIC PNL TOTAL CA: CPT

## 2023-09-10 PROCEDURE — 81001 URINALYSIS AUTO W/SCOPE: CPT

## 2023-09-10 PROCEDURE — 93010 ELECTROCARDIOGRAM REPORT: CPT

## 2023-09-10 PROCEDURE — 80320 DRUG SCREEN QUANTALCOHOLS: CPT

## 2023-09-10 RX ADMIN — ASPIRIN 324 MG: 81 TABLET, CHEWABLE ORAL at 21:03

## 2023-09-11 VITALS
DIASTOLIC BLOOD PRESSURE: 80 MMHG | HEART RATE: 61 BPM | OXYGEN SATURATION: 96 % | RESPIRATION RATE: 23 BRPM | SYSTOLIC BLOOD PRESSURE: 174 MMHG

## 2023-09-11 VITALS — DIASTOLIC BLOOD PRESSURE: 95 MMHG | HEART RATE: 66 BPM | SYSTOLIC BLOOD PRESSURE: 177 MMHG

## 2023-09-11 VITALS
OXYGEN SATURATION: 98 % | DIASTOLIC BLOOD PRESSURE: 81 MMHG | RESPIRATION RATE: 11 BRPM | SYSTOLIC BLOOD PRESSURE: 173 MMHG

## 2023-09-11 VITALS
RESPIRATION RATE: 24 BRPM | SYSTOLIC BLOOD PRESSURE: 174 MMHG | OXYGEN SATURATION: 97 % | DIASTOLIC BLOOD PRESSURE: 96 MMHG | HEART RATE: 63 BPM

## 2023-09-11 VITALS — HEART RATE: 60 BPM | RESPIRATION RATE: 20 BRPM

## 2023-09-11 VITALS
OXYGEN SATURATION: 99 % | HEART RATE: 72 BPM | DIASTOLIC BLOOD PRESSURE: 79 MMHG | SYSTOLIC BLOOD PRESSURE: 153 MMHG | TEMPERATURE: 98.24 F | RESPIRATION RATE: 19 BRPM

## 2023-09-11 VITALS — HEART RATE: 66 BPM | RESPIRATION RATE: 21 BRPM | OXYGEN SATURATION: 97 %

## 2023-09-11 VITALS
SYSTOLIC BLOOD PRESSURE: 175 MMHG | DIASTOLIC BLOOD PRESSURE: 84 MMHG | OXYGEN SATURATION: 97 % | HEART RATE: 60 BPM | RESPIRATION RATE: 23 BRPM

## 2023-09-11 VITALS
HEART RATE: 57 BPM | DIASTOLIC BLOOD PRESSURE: 80 MMHG | OXYGEN SATURATION: 95 % | RESPIRATION RATE: 20 BRPM | SYSTOLIC BLOOD PRESSURE: 177 MMHG

## 2023-09-11 VITALS — RESPIRATION RATE: 45 BRPM | HEART RATE: 64 BPM

## 2023-09-11 VITALS
HEART RATE: 62 BPM | SYSTOLIC BLOOD PRESSURE: 167 MMHG | OXYGEN SATURATION: 96 % | RESPIRATION RATE: 36 BRPM | DIASTOLIC BLOOD PRESSURE: 81 MMHG

## 2023-09-11 VITALS
SYSTOLIC BLOOD PRESSURE: 156 MMHG | HEART RATE: 71 BPM | DIASTOLIC BLOOD PRESSURE: 76 MMHG | TEMPERATURE: 98.42 F | OXYGEN SATURATION: 98 % | RESPIRATION RATE: 17 BRPM

## 2023-09-11 VITALS
DIASTOLIC BLOOD PRESSURE: 95 MMHG | SYSTOLIC BLOOD PRESSURE: 177 MMHG | RESPIRATION RATE: 28 BRPM | OXYGEN SATURATION: 96 % | HEART RATE: 67 BPM

## 2023-09-11 VITALS — HEART RATE: 64 BPM | DIASTOLIC BLOOD PRESSURE: 80 MMHG | SYSTOLIC BLOOD PRESSURE: 160 MMHG

## 2023-09-11 VITALS — RESPIRATION RATE: 26 BRPM | OXYGEN SATURATION: 93 % | HEART RATE: 64 BPM

## 2023-09-11 VITALS — SYSTOLIC BLOOD PRESSURE: 171 MMHG | DIASTOLIC BLOOD PRESSURE: 75 MMHG | HEART RATE: 80 BPM

## 2023-09-11 VITALS — DIASTOLIC BLOOD PRESSURE: 77 MMHG | HEART RATE: 57 BPM | RESPIRATION RATE: 25 BRPM | SYSTOLIC BLOOD PRESSURE: 158 MMHG

## 2023-09-11 VITALS
SYSTOLIC BLOOD PRESSURE: 211 MMHG | DIASTOLIC BLOOD PRESSURE: 112 MMHG | HEART RATE: 64 BPM | OXYGEN SATURATION: 97 % | RESPIRATION RATE: 24 BRPM

## 2023-09-11 VITALS — SYSTOLIC BLOOD PRESSURE: 167 MMHG | HEART RATE: 74 BPM | DIASTOLIC BLOOD PRESSURE: 81 MMHG

## 2023-09-11 VITALS — OXYGEN SATURATION: 98 % | RESPIRATION RATE: 27 BRPM | HEART RATE: 67 BPM

## 2023-09-11 VITALS — OXYGEN SATURATION: 96 %

## 2023-09-11 VITALS — HEART RATE: 60 BPM | RESPIRATION RATE: 23 BRPM | OXYGEN SATURATION: 95 %

## 2023-09-11 VITALS — HEART RATE: 62 BPM | RESPIRATION RATE: 24 BRPM | OXYGEN SATURATION: 97 %

## 2023-09-11 VITALS — RESPIRATION RATE: 28 BRPM | OXYGEN SATURATION: 96 % | HEART RATE: 61 BPM

## 2023-09-11 VITALS — RESPIRATION RATE: 27 BRPM | OXYGEN SATURATION: 97 % | HEART RATE: 63 BPM

## 2023-09-11 LAB
ADD MANUAL DIFF / SLIDE REVIEW: NO
ALBUMIN SERPL-MCNC: 3.8 G/DL (ref 3.5–5)
ALBUMIN/GLOB SERPL: 1.4 {RATIO} (ref 1–2.8)
ALP SERPL-CCNC: 82 U/L (ref 38–126)
ALT SERPL-CCNC: 19 IU/L (ref ?–50)
APPEARANCE UR: CLEAR
BILIRUBIN URINE UA: NEGATIVE
BUN SERPL-MCNC: 12 MG/DL (ref 9–20)
BUN SERPL-MCNC: 13 MG/DL (ref 9–20)
CALCIUM SERPL-MCNC: 8 MG/DL (ref 8.4–10.2)
CALCIUM SERPL-MCNC: 8.1 MG/DL (ref 8.4–10.2)
CHLORIDE SERPL-SCNC: 102 MMOL/L (ref 98–107)
CHLORIDE SERPL-SCNC: 102 MMOL/L (ref 98–107)
CO2 SERPL-SCNC: 24 MMOL/L (ref 22–32)
CO2 SERPL-SCNC: 24 MMOL/L (ref 22–32)
COLOR UR: YELLOW
ESTIMATED GLOMERULAR FILT RATE: > 60 ML/MIN (ref 60–?)
ESTIMATED GLOMERULAR FILT RATE: > 60 ML/MIN (ref 60–?)
GLOBULIN SER CALC-MCNC: 2.8 G/DL (ref 1.7–4.1)
GLUCOSE SERPL-MCNC: 113 MG/DL (ref 80–110)
GLUCOSE SERPL-MCNC: 149 MG/DL (ref 80–110)
GLUCOSE URINE UA: NEGATIVE G/DL
HEMATOCRIT: 39.2 % (ref 41–53)
HEMOGLOBIN: 13.1 G/DL (ref 13.5–17.5)
HEMOLYSIS: < 15 (ref 0–50)
HEMOLYSIS: < 15 (ref 0–50)
HGB UR QL: (no result)
INR PPP: 1.3 (ref 0.9–1.3)
KETONES URINE UA: (no result)
LACTATE SERPL-MCNC: 1.1 MMOL/L (ref 0.7–2.1)
LEUKOCYTE ESTERASE URINE UA: NEGATIVE
LYMPHOCYTES # SPEC AUTO: 1000 /UL (ref 1100–4500)
MCV RBC: 89.3 FL (ref 80–100)
MEAN CORPUSCULAR HEMOGLOBIN: 29.8 PG (ref 26–34)
MEAN CORPUSCULAR HGB CONC: 33.4 % (ref 30–36)
NITRITE URINE UA: NEGATIVE
PH UR: 7 [PH] (ref 4.5–8)
PLATELET COUNT: 264 X10^3/UL (ref 150–400)
POTASSIUM SERPL-SCNC: 3.3 MMOL/L (ref 3.4–5.1)
POTASSIUM SERPL-SCNC: 3.4 MMOL/L (ref 3.4–5.1)
PROT SERPL-MCNC: 6.6 G/DL (ref 6.3–8.2)
PROTEIN URINE UA: NEGATIVE
PROTHROMBIN TIME: 15.1 SECONDS (ref 10.1–12.7)
SODIUM SERPL-SCNC: 135 MMOL/L (ref 137–145)
SODIUM SERPL-SCNC: 137 MMOL/L (ref 137–145)
SP GR UR: 1.01 (ref 1–1.03)
TROPONIN I SERPL-MCNC: < 0.012 NG/ML (ref 0.01–0.03)
UROBILINOGEN UR QL: 1 E.U./DL

## 2023-09-11 RX ADMIN — LABETALOL HYDROCHLORIDE 10 MG: 5 INJECTION, SOLUTION INTRAVENOUS at 08:34

## 2023-09-11 RX ADMIN — LABETALOL HYDROCHLORIDE 10 MG: 5 INJECTION, SOLUTION INTRAVENOUS at 08:57

## 2023-09-11 RX ADMIN — Medication 650 MG: at 05:10

## 2023-09-11 RX ADMIN — LABETALOL HYDROCHLORIDE 10 MG: 5 INJECTION, SOLUTION INTRAVENOUS at 07:51
